# Patient Record
Sex: FEMALE | Race: ASIAN | NOT HISPANIC OR LATINO
[De-identification: names, ages, dates, MRNs, and addresses within clinical notes are randomized per-mention and may not be internally consistent; named-entity substitution may affect disease eponyms.]

---

## 2022-12-14 ENCOUNTER — APPOINTMENT (OUTPATIENT)
Dept: ULTRASOUND IMAGING | Facility: IMAGING CENTER | Age: 57
End: 2022-12-14

## 2022-12-14 ENCOUNTER — OUTPATIENT (OUTPATIENT)
Dept: OUTPATIENT SERVICES | Facility: HOSPITAL | Age: 57
LOS: 1 days | End: 2022-12-14
Payer: MEDICAID

## 2022-12-14 DIAGNOSIS — R10.9 UNSPECIFIED ABDOMINAL PAIN: ICD-10-CM

## 2022-12-14 PROCEDURE — 76856 US EXAM PELVIC COMPLETE: CPT

## 2022-12-14 PROCEDURE — 76856 US EXAM PELVIC COMPLETE: CPT | Mod: 26

## 2022-12-14 PROCEDURE — 76700 US EXAM ABDOM COMPLETE: CPT

## 2022-12-14 PROCEDURE — 76700 US EXAM ABDOM COMPLETE: CPT | Mod: 26

## 2023-01-25 ENCOUNTER — OUTPATIENT (OUTPATIENT)
Dept: OUTPATIENT SERVICES | Facility: HOSPITAL | Age: 58
LOS: 1 days | End: 2023-01-25
Payer: MEDICAID

## 2023-01-25 ENCOUNTER — APPOINTMENT (OUTPATIENT)
Dept: MAMMOGRAPHY | Facility: IMAGING CENTER | Age: 58
End: 2023-01-25
Payer: MEDICAID

## 2023-01-25 DIAGNOSIS — Z12.39 ENCOUNTER FOR OTHER SCREENING FOR MALIGNANT NEOPLASM OF BREAST: ICD-10-CM

## 2023-01-25 DIAGNOSIS — Z00.8 ENCOUNTER FOR OTHER GENERAL EXAMINATION: ICD-10-CM

## 2023-01-25 PROCEDURE — 77063 BREAST TOMOSYNTHESIS BI: CPT | Mod: 26

## 2023-01-25 PROCEDURE — 77063 BREAST TOMOSYNTHESIS BI: CPT

## 2023-01-25 PROCEDURE — 77067 SCR MAMMO BI INCL CAD: CPT | Mod: 26

## 2023-01-25 PROCEDURE — 77067 SCR MAMMO BI INCL CAD: CPT

## 2023-03-15 ENCOUNTER — OUTPATIENT (OUTPATIENT)
Dept: OUTPATIENT SERVICES | Facility: HOSPITAL | Age: 58
LOS: 1 days | Discharge: HOME | End: 2023-03-15
Payer: MEDICAID

## 2023-03-15 VITALS — TEMPERATURE: 97 F | WEIGHT: 149.91 LBS | HEIGHT: 55 IN

## 2023-03-15 VITALS
RESPIRATION RATE: 20 BRPM | SYSTOLIC BLOOD PRESSURE: 133 MMHG | DIASTOLIC BLOOD PRESSURE: 75 MMHG | HEART RATE: 81 BPM | OXYGEN SATURATION: 100 %

## 2023-03-15 PROCEDURE — 88305 TISSUE EXAM BY PATHOLOGIST: CPT | Mod: 26

## 2023-03-15 RX ORDER — SODIUM CHLORIDE 9 MG/ML
500 INJECTION, SOLUTION INTRAVENOUS
Refills: 0 | Status: COMPLETED | OUTPATIENT
Start: 2023-03-15 | End: 2023-03-15

## 2023-03-15 RX ADMIN — SODIUM CHLORIDE 30 MILLILITER(S): 9 INJECTION, SOLUTION INTRAVENOUS at 09:30

## 2023-03-20 LAB — SURGICAL PATHOLOGY STUDY: SIGNIFICANT CHANGE UP

## 2023-05-25 PROBLEM — E78.5 HYPERLIPIDEMIA, UNSPECIFIED: Chronic | Status: ACTIVE | Noted: 2023-03-15

## 2023-05-25 PROBLEM — E03.9 HYPOTHYROIDISM, UNSPECIFIED: Chronic | Status: ACTIVE | Noted: 2023-03-15

## 2023-06-02 ENCOUNTER — OUTPATIENT (OUTPATIENT)
Dept: OUTPATIENT SERVICES | Facility: HOSPITAL | Age: 58
LOS: 1 days | End: 2023-06-02
Payer: MEDICAID

## 2023-06-02 ENCOUNTER — APPOINTMENT (OUTPATIENT)
Dept: RADIOLOGY | Facility: IMAGING CENTER | Age: 58
End: 2023-06-02
Payer: MEDICAID

## 2023-06-02 DIAGNOSIS — M25.561 PAIN IN RIGHT KNEE: ICD-10-CM

## 2023-06-02 DIAGNOSIS — M25.50 PAIN IN UNSPECIFIED JOINT: ICD-10-CM

## 2023-06-02 DIAGNOSIS — M25.512 PAIN IN LEFT SHOULDER: ICD-10-CM

## 2023-06-02 DIAGNOSIS — I10 ESSENTIAL (PRIMARY) HYPERTENSION: ICD-10-CM

## 2023-06-02 PROCEDURE — 72040 X-RAY EXAM NECK SPINE 2-3 VW: CPT | Mod: 26

## 2023-06-02 PROCEDURE — 73030 X-RAY EXAM OF SHOULDER: CPT | Mod: 26,LT

## 2023-06-02 PROCEDURE — 73564 X-RAY EXAM KNEE 4 OR MORE: CPT | Mod: 26,50

## 2023-06-02 PROCEDURE — 73564 X-RAY EXAM KNEE 4 OR MORE: CPT

## 2023-06-02 PROCEDURE — 71046 X-RAY EXAM CHEST 2 VIEWS: CPT | Mod: 26

## 2023-06-02 PROCEDURE — 73030 X-RAY EXAM OF SHOULDER: CPT

## 2023-06-02 PROCEDURE — 71046 X-RAY EXAM CHEST 2 VIEWS: CPT

## 2023-06-02 PROCEDURE — 72040 X-RAY EXAM NECK SPINE 2-3 VW: CPT

## 2023-08-18 ENCOUNTER — EMERGENCY (EMERGENCY)
Facility: HOSPITAL | Age: 58
LOS: 0 days | Discharge: HOME | End: 2023-08-18
Attending: STUDENT IN AN ORGANIZED HEALTH CARE EDUCATION/TRAINING PROGRAM
Payer: MEDICAID

## 2023-08-18 VITALS
WEIGHT: 145.06 LBS | TEMPERATURE: 98 F | RESPIRATION RATE: 17 BRPM | SYSTOLIC BLOOD PRESSURE: 145 MMHG | DIASTOLIC BLOOD PRESSURE: 88 MMHG | OXYGEN SATURATION: 100 % | HEART RATE: 97 BPM | HEIGHT: 62 IN

## 2023-08-18 DIAGNOSIS — R10.13 EPIGASTRIC PAIN: ICD-10-CM

## 2023-08-18 DIAGNOSIS — R30.0 DYSURIA: ICD-10-CM

## 2023-08-18 DIAGNOSIS — E78.5 HYPERLIPIDEMIA, UNSPECIFIED: ICD-10-CM

## 2023-08-18 DIAGNOSIS — E03.9 HYPOTHYROIDISM, UNSPECIFIED: ICD-10-CM

## 2023-08-18 DIAGNOSIS — N83.202 UNSPECIFIED OVARIAN CYST, LEFT SIDE: ICD-10-CM

## 2023-08-18 DIAGNOSIS — R10.32 LEFT LOWER QUADRANT PAIN: ICD-10-CM

## 2023-08-18 LAB
ALBUMIN SERPL ELPH-MCNC: 3.4 G/DL — SIGNIFICANT CHANGE UP (ref 3.3–5)
ALP SERPL-CCNC: 125 U/L — HIGH (ref 40–120)
ALT FLD-CCNC: 22 U/L — SIGNIFICANT CHANGE UP (ref 12–78)
ANION GAP SERPL CALC-SCNC: 4 MMOL/L — LOW (ref 5–17)
APPEARANCE UR: CLEAR — SIGNIFICANT CHANGE UP
APTT BLD: 33.8 SEC — SIGNIFICANT CHANGE UP (ref 24.5–35.6)
AST SERPL-CCNC: 11 U/L — LOW (ref 15–37)
BACTERIA # UR AUTO: ABNORMAL
BASOPHILS # BLD AUTO: 0.07 K/UL — SIGNIFICANT CHANGE UP (ref 0–0.2)
BASOPHILS NFR BLD AUTO: 0.9 % — SIGNIFICANT CHANGE UP (ref 0–2)
BILIRUB SERPL-MCNC: 0.6 MG/DL — SIGNIFICANT CHANGE UP (ref 0.2–1.2)
BILIRUB UR-MCNC: NEGATIVE — SIGNIFICANT CHANGE UP
BUN SERPL-MCNC: 10 MG/DL — SIGNIFICANT CHANGE UP (ref 7–23)
CALCIUM SERPL-MCNC: 8.8 MG/DL — SIGNIFICANT CHANGE UP (ref 8.5–10.1)
CHLORIDE SERPL-SCNC: 109 MMOL/L — HIGH (ref 96–108)
CO2 SERPL-SCNC: 27 MMOL/L — SIGNIFICANT CHANGE UP (ref 22–31)
COLOR SPEC: YELLOW — SIGNIFICANT CHANGE UP
CREAT SERPL-MCNC: 0.66 MG/DL — SIGNIFICANT CHANGE UP (ref 0.5–1.3)
DIFF PNL FLD: NEGATIVE — SIGNIFICANT CHANGE UP
EGFR: 102 ML/MIN/1.73M2 — SIGNIFICANT CHANGE UP
EOSINOPHIL # BLD AUTO: 0.28 K/UL — SIGNIFICANT CHANGE UP (ref 0–0.5)
EOSINOPHIL NFR BLD AUTO: 3.5 % — SIGNIFICANT CHANGE UP (ref 0–6)
EPI CELLS # UR: SIGNIFICANT CHANGE UP
GLUCOSE SERPL-MCNC: 132 MG/DL — HIGH (ref 70–99)
GLUCOSE UR QL: NEGATIVE MG/DL — SIGNIFICANT CHANGE UP
HCT VFR BLD CALC: 36.6 % — SIGNIFICANT CHANGE UP (ref 34.5–45)
HGB BLD-MCNC: 11.8 G/DL — SIGNIFICANT CHANGE UP (ref 11.5–15.5)
IMM GRANULOCYTES NFR BLD AUTO: 0.1 % — SIGNIFICANT CHANGE UP (ref 0–0.9)
INR BLD: 0.85 RATIO — SIGNIFICANT CHANGE UP (ref 0.85–1.18)
KETONES UR-MCNC: NEGATIVE — SIGNIFICANT CHANGE UP
LEUKOCYTE ESTERASE UR-ACNC: NEGATIVE — SIGNIFICANT CHANGE UP
LIDOCAIN IGE QN: 142 U/L — SIGNIFICANT CHANGE UP (ref 73–393)
LYMPHOCYTES # BLD AUTO: 2.63 K/UL — SIGNIFICANT CHANGE UP (ref 1–3.3)
LYMPHOCYTES # BLD AUTO: 32.6 % — SIGNIFICANT CHANGE UP (ref 13–44)
MCHC RBC-ENTMCNC: 26.4 PG — LOW (ref 27–34)
MCHC RBC-ENTMCNC: 32.2 G/DL — SIGNIFICANT CHANGE UP (ref 32–36)
MCV RBC AUTO: 81.9 FL — SIGNIFICANT CHANGE UP (ref 80–100)
MONOCYTES # BLD AUTO: 0.53 K/UL — SIGNIFICANT CHANGE UP (ref 0–0.9)
MONOCYTES NFR BLD AUTO: 6.6 % — SIGNIFICANT CHANGE UP (ref 2–14)
NEUTROPHILS # BLD AUTO: 4.54 K/UL — SIGNIFICANT CHANGE UP (ref 1.8–7.4)
NEUTROPHILS NFR BLD AUTO: 56.3 % — SIGNIFICANT CHANGE UP (ref 43–77)
NITRITE UR-MCNC: NEGATIVE — SIGNIFICANT CHANGE UP
NRBC # BLD: 0 /100 WBCS — SIGNIFICANT CHANGE UP (ref 0–0)
PH UR: 7 — SIGNIFICANT CHANGE UP (ref 5–8)
PLATELET # BLD AUTO: 372 K/UL — SIGNIFICANT CHANGE UP (ref 150–400)
POTASSIUM SERPL-MCNC: 4.4 MMOL/L — SIGNIFICANT CHANGE UP (ref 3.5–5.3)
POTASSIUM SERPL-SCNC: 4.4 MMOL/L — SIGNIFICANT CHANGE UP (ref 3.5–5.3)
PROT SERPL-MCNC: 7.7 GM/DL — SIGNIFICANT CHANGE UP (ref 6–8.3)
PROT UR-MCNC: NEGATIVE MG/DL — SIGNIFICANT CHANGE UP
PROTHROM AB SERPL-ACNC: 10.2 SEC — SIGNIFICANT CHANGE UP (ref 9.5–13)
RBC # BLD: 4.47 M/UL — SIGNIFICANT CHANGE UP (ref 3.8–5.2)
RBC # FLD: 13.1 % — SIGNIFICANT CHANGE UP (ref 10.3–14.5)
RBC CASTS # UR COMP ASSIST: NEGATIVE /HPF — SIGNIFICANT CHANGE UP (ref 0–4)
SODIUM SERPL-SCNC: 140 MMOL/L — SIGNIFICANT CHANGE UP (ref 135–145)
SP GR SPEC: 1 — LOW (ref 1.01–1.02)
TROPONIN I, HIGH SENSITIVITY RESULT: 5.5 NG/L — SIGNIFICANT CHANGE UP
UROBILINOGEN FLD QL: NEGATIVE MG/DL — SIGNIFICANT CHANGE UP
WBC # BLD: 8.06 K/UL — SIGNIFICANT CHANGE UP (ref 3.8–10.5)
WBC # FLD AUTO: 8.06 K/UL — SIGNIFICANT CHANGE UP (ref 3.8–10.5)
WBC UR QL: NEGATIVE — SIGNIFICANT CHANGE UP

## 2023-08-18 PROCEDURE — 76705 ECHO EXAM OF ABDOMEN: CPT | Mod: 26

## 2023-08-18 PROCEDURE — 93010 ELECTROCARDIOGRAM REPORT: CPT

## 2023-08-18 PROCEDURE — 74177 CT ABD & PELVIS W/CONTRAST: CPT | Mod: 26,MA

## 2023-08-18 PROCEDURE — 99285 EMERGENCY DEPT VISIT HI MDM: CPT

## 2023-08-18 PROCEDURE — 76830 TRANSVAGINAL US NON-OB: CPT | Mod: 26

## 2023-08-18 PROCEDURE — 76856 US EXAM PELVIC COMPLETE: CPT | Mod: 26

## 2023-08-18 RX ORDER — KETOROLAC TROMETHAMINE 30 MG/ML
15 SYRINGE (ML) INJECTION ONCE
Refills: 0 | Status: DISCONTINUED | OUTPATIENT
Start: 2023-08-18 | End: 2023-08-18

## 2023-08-18 RX ORDER — SODIUM CHLORIDE 9 MG/ML
1000 INJECTION INTRAMUSCULAR; INTRAVENOUS; SUBCUTANEOUS ONCE
Refills: 0 | Status: COMPLETED | OUTPATIENT
Start: 2023-08-18 | End: 2023-08-18

## 2023-08-18 RX ADMIN — Medication 15 MILLIGRAM(S): at 19:40

## 2023-08-18 RX ADMIN — SODIUM CHLORIDE 1000 MILLILITER(S): 9 INJECTION INTRAMUSCULAR; INTRAVENOUS; SUBCUTANEOUS at 18:14

## 2023-08-18 RX ADMIN — Medication 15 MILLIGRAM(S): at 18:12

## 2023-08-18 NOTE — ED PROVIDER NOTE - CLINICAL SUMMARY MEDICAL DECISION MAKING FREE TEXT BOX
58-year-old female history of hypothyroidism presents for epigastric and left lower quadrant abdominal pain.  Labs, EKG, CTAP, provide IVF, meds, reassess.

## 2023-08-18 NOTE — ED PROVIDER NOTE - ATTENDING APP SHARED VISIT CONTRIBUTION OF CARE
Patient with past medical history and HPI as above, presenting with abdominal pain.    Lab work shows no leukocytosis, no hyperbilirubinemia, no sign of urine infection.  CT shows some gallbladder wall thickening, patient was sent for ultrasound of the gallbladder.  CT also shows enlargement of the left adnexa.    US of the gallbladder negative for signs of cholecytitis  Pelvic US shows left ovarian cyst with good flow    Had a discussion with the patient regarding importance of follow-up on her findings    EKG  NSR manuel 81  No St elevations or depressions  Normal intervals

## 2023-08-18 NOTE — ED ADULT TRIAGE NOTE - CHIEF COMPLAINT QUOTE
pt biba from ambulatory with walker. pt c.o of epigastric pain and constipation x 3 days, no n/v/d. hx polio, hld, hypothyroid.

## 2023-08-18 NOTE — ED ADULT NURSE NOTE - OBJECTIVE STATEMENT
patient is A&Ox4. Breathing unlabored on RA. PMH hypothyroidism, HLD. patient complaining of epigastric and LLQ abdominal pain since last night. states the pain started around 12 am. Patient denies any radiation of pain. complaining of dysuria since this morning. patient denies any n/v/d, fever, chills, cp, sob, difficulty breathing. reports taking Aleve around 7 am this morning w/o any relief. LBM 7 pm last night. patient ambulates with walker at baseline.

## 2023-08-18 NOTE — ED PROVIDER NOTE - OBJECTIVE STATEMENT
58-year-old female history of hypothyroidism presents for epigastric and left lower quadrant abdominal pain.  Patient states started about midnight last night.  Patient states she took Aleve at 7 AM without relief.  Patient denies any fevers, chills, nausea, vomiting, diarrhea, constipation and states last bowel movement was 7 PM yesterday (in contrast to triage note).  Patient also endorses some dysuria starting this morning.

## 2023-08-18 NOTE — ED PROVIDER NOTE - NSFOLLOWUPINSTRUCTIONS_ED_ALL_ED_FT
Please follow up with your primary care doctor. Specifically please follow up with your primary care doctor regarding the ovarian cysts that were seen on your CT scan. You may need follow up care for those findings or more imaging as we discussed in the ED    Return in the ED for fever, chills, abdominal pain, shortness of breath or other concerning symptoms

## 2023-08-18 NOTE — ED PROVIDER NOTE - NS ED ATTENDING STATEMENT MOD
This was a shared visit with the NEGIN. I reviewed and verified the documentation and independently performed the documented:

## 2023-08-18 NOTE — ED ADULT NURSE NOTE - NSFALLRISKINTERV_ED_ALL_ED

## 2023-08-18 NOTE — ED PROVIDER NOTE - PATIENT PORTAL LINK FT
You can access the FollowMyHealth Patient Portal offered by [facility] by registering at the following website: [email or web address]. By joining [employer]’s FollowMyHealth portal, you will also be able to view your health information using other applications (apps) compatible with our system.

## 2023-08-18 NOTE — ED PROVIDER NOTE - PHYSICAL EXAMINATION
GEN:   comfortable, in no apparent distress, AOx3  EYES:   PERRL, extra-occular movements intact  HEENT:   airway patent, moist mucosal membranes, uvula midline  CV:  RRR, Pulses- Radial: 2+ bilateral and equal  RESP:   clear to auscultation bilaterally, non-labored, speaking in full sentences  ABD:   soft, LLQ and epigastric ttp, no guarding, neg davis's sign.  MSK:   no musculoskeletal tenderness, 5/5 strength, moving all extremities  SKIN:   dry, intact, no rash  NEURO:   AOx3, no focal weakness or loss of sensation, GCS 15  PSYCH: calm, cooperative, no apparent risk to self and others

## 2023-08-18 NOTE — ED PROVIDER NOTE - NSICDXPASTMEDICALHX_GEN_ALL_CORE_FT
PAST MEDICAL HISTORY:  Dyslipidemia with elevated low density lipoprotein (LDL) cholesterol and abnormally low high density lipoprotein (HDL) cholesterol     Hypothyroid

## 2023-08-19 VITALS
OXYGEN SATURATION: 98 % | SYSTOLIC BLOOD PRESSURE: 155 MMHG | RESPIRATION RATE: 18 BRPM | HEART RATE: 96 BPM | DIASTOLIC BLOOD PRESSURE: 87 MMHG

## 2023-08-19 NOTE — ED ADULT NURSE REASSESSMENT NOTE - NS ED NURSE REASSESS COMMENT FT1
Pt AOx4 and accepts the d/c from the MD, IV hep lock removed. Pt states she doesn't have a way to get home and that she traveled to the ED by ambulance. Pt's sister Jerry Collazo phone number [phone] called and will be picking up the patient.

## 2023-08-22 LAB
-  AMPICILLIN/SULBACTAM: SIGNIFICANT CHANGE UP
-  CEFAZOLIN: SIGNIFICANT CHANGE UP
-  GENTAMICIN: SIGNIFICANT CHANGE UP
-  OXACILLIN: SIGNIFICANT CHANGE UP
-  PENICILLIN: SIGNIFICANT CHANGE UP
-  RIFAMPIN: SIGNIFICANT CHANGE UP
-  TETRACYCLINE: SIGNIFICANT CHANGE UP
-  TRIMETHOPRIM/SULFAMETHOXAZOLE: SIGNIFICANT CHANGE UP
-  VANCOMYCIN: SIGNIFICANT CHANGE UP
CULTURE RESULTS: SIGNIFICANT CHANGE UP
METHOD TYPE: SIGNIFICANT CHANGE UP
ORGANISM # SPEC MICROSCOPIC CNT: SIGNIFICANT CHANGE UP
ORGANISM # SPEC MICROSCOPIC CNT: SIGNIFICANT CHANGE UP
SPECIMEN SOURCE: SIGNIFICANT CHANGE UP

## 2023-08-23 RX ORDER — CEPHALEXIN 500 MG
1 CAPSULE ORAL
Qty: 28 | Refills: 0
Start: 2023-08-23 | End: 2023-08-29

## 2023-09-02 ENCOUNTER — OUTPATIENT (OUTPATIENT)
Dept: OUTPATIENT SERVICES | Facility: HOSPITAL | Age: 58
LOS: 1 days | End: 2023-09-02

## 2023-09-02 DIAGNOSIS — Z00.8 ENCOUNTER FOR OTHER GENERAL EXAMINATION: ICD-10-CM

## 2023-09-03 ENCOUNTER — APPOINTMENT (OUTPATIENT)
Dept: MRI IMAGING | Facility: IMAGING CENTER | Age: 58
End: 2023-09-03
Payer: MEDICAID

## 2023-09-03 ENCOUNTER — OUTPATIENT (OUTPATIENT)
Dept: OUTPATIENT SERVICES | Facility: HOSPITAL | Age: 58
LOS: 1 days | End: 2023-09-03
Payer: MEDICAID

## 2023-09-03 DIAGNOSIS — N83.202 UNSPECIFIED OVARIAN CYST, LEFT SIDE: ICD-10-CM

## 2023-09-03 DIAGNOSIS — Z00.8 ENCOUNTER FOR OTHER GENERAL EXAMINATION: ICD-10-CM

## 2023-09-03 PROCEDURE — 72195 MRI PELVIS W/O DYE: CPT | Mod: 26

## 2023-09-03 PROCEDURE — 72195 MRI PELVIS W/O DYE: CPT

## 2023-10-09 ENCOUNTER — OUTPATIENT (OUTPATIENT)
Dept: OUTPATIENT SERVICES | Facility: HOSPITAL | Age: 58
LOS: 1 days | Discharge: STILL ADMITTED OR ADMINISTRATIVE | End: 2023-10-09
Payer: MEDICAID

## 2023-10-09 PROCEDURE — 99214 OFFICE O/P EST MOD 30 MIN: CPT

## 2023-10-18 ENCOUNTER — APPOINTMENT (OUTPATIENT)
Dept: OBGYN | Facility: CLINIC | Age: 58
End: 2023-10-18
Payer: MEDICAID

## 2023-10-18 VITALS
BODY MASS INDEX: 28.02 KG/M2 | HEIGHT: 59 IN | WEIGHT: 139 LBS | DIASTOLIC BLOOD PRESSURE: 82 MMHG | SYSTOLIC BLOOD PRESSURE: 127 MMHG | HEART RATE: 96 BPM

## 2023-10-18 DIAGNOSIS — Z01.419 ENCOUNTER FOR GYNECOLOGICAL EXAMINATION (GENERAL) (ROUTINE) W/OUT ABNORMAL FINDINGS: ICD-10-CM

## 2023-10-18 DIAGNOSIS — Z87.19 PERSONAL HISTORY OF OTHER DISEASES OF THE DIGESTIVE SYSTEM: ICD-10-CM

## 2023-10-18 DIAGNOSIS — E78.5 HYPERLIPIDEMIA, UNSPECIFIED: ICD-10-CM

## 2023-10-18 DIAGNOSIS — E07.9 DISORDER OF THYROID, UNSPECIFIED: ICD-10-CM

## 2023-10-18 DIAGNOSIS — Z86.59 PERSONAL HISTORY OF OTHER MENTAL AND BEHAVIORAL DISORDERS: ICD-10-CM

## 2023-10-18 DIAGNOSIS — Z82.3 FAMILY HISTORY OF STROKE: ICD-10-CM

## 2023-10-18 DIAGNOSIS — A80.9 ACUTE POLIOMYELITIS, UNSPECIFIED: ICD-10-CM

## 2023-10-18 DIAGNOSIS — Z82.5 FAMILY HISTORY OF ASTHMA AND OTHER CHRONIC LOWER RESPIRATORY DISEASES: ICD-10-CM

## 2023-10-18 DIAGNOSIS — Z78.9 OTHER SPECIFIED HEALTH STATUS: ICD-10-CM

## 2023-10-18 DIAGNOSIS — Z12.39 ENCOUNTER FOR OTHER SCREENING FOR MALIGNANT NEOPLASM OF BREAST: ICD-10-CM

## 2023-10-18 PROCEDURE — 99386 PREV VISIT NEW AGE 40-64: CPT

## 2023-10-18 PROCEDURE — 99213 OFFICE O/P EST LOW 20 MIN: CPT | Mod: 25

## 2023-10-18 RX ORDER — LEVOTHYROXINE SODIUM 0.1 MG/1
100 TABLET ORAL
Refills: 0 | Status: ACTIVE | COMMUNITY

## 2023-10-18 RX ORDER — SIMVASTATIN 20 MG/1
20 TABLET, FILM COATED ORAL
Refills: 0 | Status: ACTIVE | COMMUNITY

## 2023-10-19 PROBLEM — E78.5 HYPERLIPIDEMIA: Status: ACTIVE | Noted: 2023-10-19

## 2023-10-19 PROBLEM — A80.9 POLIO: Status: RESOLVED | Noted: 2023-10-19 | Resolved: 2023-10-19

## 2023-10-19 PROBLEM — Z87.19 HISTORY OF GASTROESOPHAGEAL REFLUX (GERD): Status: RESOLVED | Noted: 2023-10-19 | Resolved: 2023-10-19

## 2023-10-19 PROBLEM — Z86.59 HISTORY OF INTELLECTUAL DISABILITY: Status: RESOLVED | Noted: 2023-10-19 | Resolved: 2023-10-19

## 2023-10-19 PROBLEM — Z82.5 FAMILY HISTORY OF CHRONIC OBSTRUCTIVE PULMONARY DISEASE: Status: ACTIVE | Noted: 2023-10-19

## 2023-10-19 PROBLEM — E07.9 THYROID DISORDER: Status: ACTIVE | Noted: 2023-10-19

## 2023-10-19 PROBLEM — Z78.9 NON-SMOKER: Status: ACTIVE | Noted: 2023-10-19

## 2023-10-19 PROBLEM — Z82.3 FAMILY HISTORY OF STROKE: Status: ACTIVE | Noted: 2023-10-19

## 2023-10-19 LAB
CANCER AG125 SERPL-ACNC: 4 U/ML
CANCER AG19-9 SERPL-ACNC: <2 U/ML
HPV HIGH+LOW RISK DNA PNL CVX: NOT DETECTED

## 2023-10-23 LAB — CYTOLOGY CVX/VAG DOC THIN PREP: NORMAL

## 2023-10-31 DIAGNOSIS — F32.A DEPRESSION, UNSPECIFIED: ICD-10-CM

## 2023-12-27 ENCOUNTER — OUTPATIENT (OUTPATIENT)
Dept: OUTPATIENT SERVICES | Facility: HOSPITAL | Age: 58
LOS: 1 days | End: 2023-12-27
Payer: MEDICAID

## 2023-12-27 ENCOUNTER — APPOINTMENT (OUTPATIENT)
Dept: MRI IMAGING | Facility: IMAGING CENTER | Age: 58
End: 2023-12-27
Payer: MEDICAID

## 2023-12-27 ENCOUNTER — RESULT REVIEW (OUTPATIENT)
Age: 58
End: 2023-12-27

## 2023-12-27 DIAGNOSIS — N94.89 OTHER SPECIFIED CONDITIONS ASSOCIATED WITH FEMALE GENITAL ORGANS AND MENSTRUAL CYCLE: ICD-10-CM

## 2023-12-27 DIAGNOSIS — Z00.8 ENCOUNTER FOR OTHER GENERAL EXAMINATION: ICD-10-CM

## 2023-12-27 PROCEDURE — 72197 MRI PELVIS W/O & W/DYE: CPT | Mod: 26

## 2023-12-27 PROCEDURE — 72197 MRI PELVIS W/O & W/DYE: CPT

## 2024-01-10 ENCOUNTER — APPOINTMENT (OUTPATIENT)
Dept: OBGYN | Facility: CLINIC | Age: 59
End: 2024-01-10
Payer: MEDICAID

## 2024-01-10 DIAGNOSIS — N94.89 OTHER SPECIFIED CONDITIONS ASSOCIATED WITH FEMALE GENITAL ORGANS AND MENSTRUAL CYCLE: ICD-10-CM

## 2024-01-10 PROCEDURE — 99213 OFFICE O/P EST LOW 20 MIN: CPT | Mod: 95

## 2024-01-10 NOTE — HISTORY OF PRESENT ILLNESS
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

## 2024-01-10 NOTE — PLAN
[FreeTextEntry1] : rec MRI pelvs in 6 mos--to call the month prior for auth Extensive review of above mentioned concerns. All questions and concerns addressed, patient expressed understanding. Encouraged to contact the office with any questions or concerns. Benign features on MRI imaging

## 2024-01-10 NOTE — REASON FOR VISIT
[Home] : at home, [unfilled] , at the time of the visit. [Medical Office: ([facility])___] : at the medical office located in  [Family Member] : family member [Patient] : the patient [Follow-Up] : a follow-up evaluation of

## 2024-08-20 ENCOUNTER — INPATIENT (INPATIENT)
Facility: HOSPITAL | Age: 59
LOS: 9 days | Discharge: HOME | End: 2024-08-30
Attending: PSYCHIATRY & NEUROLOGY | Admitting: PSYCHIATRY & NEUROLOGY
Payer: MEDICAID

## 2024-08-20 VITALS
OXYGEN SATURATION: 98 % | HEIGHT: 60 IN | SYSTOLIC BLOOD PRESSURE: 126 MMHG | TEMPERATURE: 98 F | WEIGHT: 143.3 LBS | DIASTOLIC BLOOD PRESSURE: 95 MMHG | RESPIRATION RATE: 18 BRPM | HEART RATE: 88 BPM

## 2024-08-20 LAB
ADD ON TEST-SPECIMEN IN LAB: SIGNIFICANT CHANGE UP
ALBUMIN SERPL ELPH-MCNC: 4.2 G/DL — SIGNIFICANT CHANGE UP (ref 3.3–5)
ALP SERPL-CCNC: 120 U/L — SIGNIFICANT CHANGE UP (ref 40–120)
ALT FLD-CCNC: 17 U/L — SIGNIFICANT CHANGE UP (ref 4–33)
ANION GAP SERPL CALC-SCNC: 13 MMOL/L — SIGNIFICANT CHANGE UP (ref 7–14)
APAP SERPL-MCNC: <10 UG/ML — LOW (ref 15–25)
APPEARANCE UR: CLEAR — SIGNIFICANT CHANGE UP
AST SERPL-CCNC: 15 U/L — SIGNIFICANT CHANGE UP (ref 4–32)
BACTERIA # UR AUTO: NEGATIVE /HPF — SIGNIFICANT CHANGE UP
BASOPHILS # BLD AUTO: 0.05 K/UL — SIGNIFICANT CHANGE UP (ref 0–0.2)
BASOPHILS NFR BLD AUTO: 0.6 % — SIGNIFICANT CHANGE UP (ref 0–2)
BILIRUB SERPL-MCNC: 0.5 MG/DL — SIGNIFICANT CHANGE UP (ref 0.2–1.2)
BILIRUB UR-MCNC: NEGATIVE — SIGNIFICANT CHANGE UP
BUN SERPL-MCNC: 8 MG/DL — SIGNIFICANT CHANGE UP (ref 7–23)
CALCIUM SERPL-MCNC: 9.1 MG/DL — SIGNIFICANT CHANGE UP (ref 8.4–10.5)
CAST: 0 /LPF — SIGNIFICANT CHANGE UP (ref 0–4)
CHLORIDE SERPL-SCNC: 102 MMOL/L — SIGNIFICANT CHANGE UP (ref 98–107)
CO2 SERPL-SCNC: 23 MMOL/L — SIGNIFICANT CHANGE UP (ref 22–31)
COLOR SPEC: YELLOW — SIGNIFICANT CHANGE UP
CREAT SERPL-MCNC: 0.66 MG/DL — SIGNIFICANT CHANGE UP (ref 0.5–1.3)
DIFF PNL FLD: NEGATIVE — SIGNIFICANT CHANGE UP
EGFR: 101 ML/MIN/1.73M2 — SIGNIFICANT CHANGE UP
EOSINOPHIL # BLD AUTO: 0.09 K/UL — SIGNIFICANT CHANGE UP (ref 0–0.5)
EOSINOPHIL NFR BLD AUTO: 1 % — SIGNIFICANT CHANGE UP (ref 0–6)
ETHANOL SERPL-MCNC: <10 MG/DL — SIGNIFICANT CHANGE UP
GLUCOSE SERPL-MCNC: 162 MG/DL — HIGH (ref 70–99)
GLUCOSE UR QL: 100 MG/DL
HCT VFR BLD CALC: 38.2 % — SIGNIFICANT CHANGE UP (ref 34.5–45)
HGB BLD-MCNC: 12.6 G/DL — SIGNIFICANT CHANGE UP (ref 11.5–15.5)
IANC: 5.45 K/UL — SIGNIFICANT CHANGE UP (ref 1.8–7.4)
IMM GRANULOCYTES NFR BLD AUTO: 0.2 % — SIGNIFICANT CHANGE UP (ref 0–0.9)
KETONES UR-MCNC: NEGATIVE MG/DL — SIGNIFICANT CHANGE UP
LEUKOCYTE ESTERASE UR-ACNC: ABNORMAL
LYMPHOCYTES # BLD AUTO: 2.71 K/UL — SIGNIFICANT CHANGE UP (ref 1–3.3)
LYMPHOCYTES # BLD AUTO: 30.7 % — SIGNIFICANT CHANGE UP (ref 13–44)
MCHC RBC-ENTMCNC: 27.5 PG — SIGNIFICANT CHANGE UP (ref 27–34)
MCHC RBC-ENTMCNC: 33 GM/DL — SIGNIFICANT CHANGE UP (ref 32–36)
MCV RBC AUTO: 83.4 FL — SIGNIFICANT CHANGE UP (ref 80–100)
MONOCYTES # BLD AUTO: 0.51 K/UL — SIGNIFICANT CHANGE UP (ref 0–0.9)
MONOCYTES NFR BLD AUTO: 5.8 % — SIGNIFICANT CHANGE UP (ref 2–14)
NEUTROPHILS # BLD AUTO: 5.45 K/UL — SIGNIFICANT CHANGE UP (ref 1.8–7.4)
NEUTROPHILS NFR BLD AUTO: 61.7 % — SIGNIFICANT CHANGE UP (ref 43–77)
NITRITE UR-MCNC: NEGATIVE — SIGNIFICANT CHANGE UP
NRBC # BLD: 0 /100 WBCS — SIGNIFICANT CHANGE UP (ref 0–0)
NRBC # FLD: 0 K/UL — SIGNIFICANT CHANGE UP (ref 0–0)
PH UR: 6.5 — SIGNIFICANT CHANGE UP (ref 5–8)
PLATELET # BLD AUTO: 365 K/UL — SIGNIFICANT CHANGE UP (ref 150–400)
POTASSIUM SERPL-MCNC: 4 MMOL/L — SIGNIFICANT CHANGE UP (ref 3.5–5.3)
POTASSIUM SERPL-SCNC: 4 MMOL/L — SIGNIFICANT CHANGE UP (ref 3.5–5.3)
PROT SERPL-MCNC: 7.9 G/DL — SIGNIFICANT CHANGE UP (ref 6–8.3)
PROT UR-MCNC: NEGATIVE MG/DL — SIGNIFICANT CHANGE UP
RBC # BLD: 4.58 M/UL — SIGNIFICANT CHANGE UP (ref 3.8–5.2)
RBC # FLD: 12.1 % — SIGNIFICANT CHANGE UP (ref 10.3–14.5)
RBC CASTS # UR COMP ASSIST: 0 /HPF — SIGNIFICANT CHANGE UP (ref 0–4)
SALICYLATES SERPL-MCNC: 2.7 MG/DL — LOW (ref 15–30)
SARS-COV-2 RNA SPEC QL NAA+PROBE: SIGNIFICANT CHANGE UP
SODIUM SERPL-SCNC: 138 MMOL/L — SIGNIFICANT CHANGE UP (ref 135–145)
SP GR SPEC: 1 — SIGNIFICANT CHANGE UP (ref 1–1.03)
SQUAMOUS # UR AUTO: 2 /HPF — SIGNIFICANT CHANGE UP (ref 0–5)
TOXICOLOGY SCREEN, DRUGS OF ABUSE, SERUM RESULT: SIGNIFICANT CHANGE UP
TSH SERPL-MCNC: 0.27 UIU/ML — SIGNIFICANT CHANGE UP (ref 0.27–4.2)
UROBILINOGEN FLD QL: 0.2 MG/DL — SIGNIFICANT CHANGE UP (ref 0.2–1)
WBC # BLD: 8.83 K/UL — SIGNIFICANT CHANGE UP (ref 3.8–10.5)
WBC # FLD AUTO: 8.83 K/UL — SIGNIFICANT CHANGE UP (ref 3.8–10.5)
WBC UR QL: 13 /HPF — HIGH (ref 0–5)

## 2024-08-20 PROCEDURE — 99285 EMERGENCY DEPT VISIT HI MDM: CPT

## 2024-08-20 RX ORDER — LEVOTHYROXINE SODIUM 100 MCG
100 TABLET ORAL DAILY
Refills: 0 | Status: DISCONTINUED | OUTPATIENT
Start: 2024-08-21 | End: 2024-08-21

## 2024-08-20 NOTE — ED BEHAVIORAL HEALTH ASSESSMENT NOTE - SUMMARY
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     Patient presents with acute on chronic worsening psychosis of unclear etiology. He is increasingly agitated and paranoid at home, experiencing significant distress around her paranoid delusions, feels unsafe at home. Would benefit from inpatient psychiatric admission for safety and stabilization. Family is in agreement with plan. Of note, patient will require an assistive walking device on the unit, can use a cane, walker, or wheelchair. Patient would also benefit from neurology consult for post-polio syndrome.     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     Patient presents with acute on chronic worsening psychosis of unclear etiology. She is increasingly agitated and paranoid at home, experiencing significant distress around her paranoid delusions, feels unsafe at home. Would benefit from inpatient psychiatric admission for safety and stabilization. Family is in agreement with plan. Of note, patient will require an assistive walking device on the unit, can use a cane, walker, or wheelchair. Patient would also benefit from neurology consult for post-polio syndrome.     Plan:  - admit patient on 9.27  - continue home medications simvastatin 40mg, synthroid 100mcg, iron, multivitamin  - hold seroquel at this time given poor efficacy  - PRN for agitation: haldol 5mg, ativan 1mg, benadryl 50mg IM/PO  - PRN for insomnia: seroquel 50mg   - defer standing medication to primary team  - recommend neurology consult on inpatient unit  - [facility]

## 2024-08-20 NOTE — ED PROVIDER NOTE - PROGRESS NOTE DETAILS
NP Mally- pt labs unremarkable, psych recommendation inpatient tx for mental health stabilization, at this time boarding, lack psych bed. Pt walks with assistant device, cane.

## 2024-08-20 NOTE — ED PROVIDER NOTE - OBJECTIVE STATEMENT
This is a 59-year-old female past medical history hypothyroidism, polio, unsteady gait,  past psychiatric history schizophrenia with complaining of auditory visual hallucinations and paranoia. Patient arrived from home after her sister Ree [phone] activated 911 due to mental health deterioration. Patient believes a man is in her closet and wants to kill her. She is   already planning her .

## 2024-08-20 NOTE — ED PROVIDER NOTE - CLINICAL SUMMARY MEDICAL DECISION MAKING FREE TEXT BOX
This is a 59-year-old female past medical history hypothyroidism, polio, unsteady gait,  past psychiatric history schizophrenia with complaining of auditory visual hallucinations and paranoia. Patient arrived from home after her sister Ree  activated 911 due to mental health deterioration. Patient believes a man is in her closet and wants to kill her. She is   already planning her . PT currently not in treatment.   psych consults, and psych clearance labs,

## 2024-08-20 NOTE — ED BEHAVIORAL HEALTH NOTE - BEHAVIORAL HEALTH NOTE
As per provider, worker called patient’s sister Ree ([phone]) for collateral information. All information is as per sister Ree:    Patient is a 59-year-old female, domiciled with younger sister, with a dx of schizophrenia bipolar, with no psychiatric admissions, bibems for worsening paranoia. Patient never went to school and usually when she meets with the doctor says that she is ok but is not forthcoming. Patient was seen a couple months ago at the [facility] and at that time was referred out to [facility]. Sister states at that time not much was done. Patient  has been saying that there is a man in her closet and that man is beating her and they are going to stab her and beat her. She states that man is planning her . Sister states that man is a family friend from years ago and the family does not communicate with this person for years. Patient as of lately has been worsening. Patient has been saying this man has been in her closet for years. Sister states that as of lately the patient has been having frequent episodes where she screams and does not know how to calm down. Sister states that the patient use to live with their mother but she passed away two years ago. Patient is linked to Pcp- dr. garibay ([phone]). Patient has polio when she was born and this affected her limbs. Patient has cholesterol and hyperthyroidism. Patient is compliant with her medications.    Levothyroxine 100 mg once a day  simvastatin 40 mg once a day  quetiapine 50 mg once a day  iron pills     Patient has a unsteady gait and  walks with walker and cane . Pt is not on any drugs or alcohol. Patient has been eating and showering at baseline. Patient has been going to bed but a lot of the time will talk to herself in her room. No si/hi. No past Sa. Patient thinks that the man that lives in her closet is punching her in the eye and now she has headaches. Patient thinks that both her sisters is poisoning her food but has thought this for years. No access to guns, Patient does not leave the house on her own.

## 2024-08-20 NOTE — ED ADULT NURSE NOTE - CAS DISCH TRANSFER METHOD
[de-identified] : Patient would benefit at this time I advised the patient as to the his condition and the treatment options available.  I would like him to begin treatment with anti-inflammatory medication.  I discussed the risk of this medication with the patient.  I also advised to utilize some sort of bracing along with activity modification to avoid impact activity and going up and down stairs.  I will see him back in 3 weeks for reassessment.  All questions answered. Ambulance

## 2024-08-20 NOTE — ED ADULT NURSE NOTE - OBJECTIVE STATEMENT
Pt arrives by ems from home for c/o hallucinations and insomnia. Pt has a hx of polio, hypothyroid, ambulates with assistance with a walker.  Pt calm on arrival, denies si,hi,etoh, drug use. Calm on arrival. Family called on pt as she was hallucinating and saying there is a man in her bedroom closet.

## 2024-08-20 NOTE — ED ADULT NURSE NOTE - IN ACCORDANCE WITH [place] LAW, WE OFFER EVERY PATIENT A HEPATITIS C TEST. WOULD YOU LIKE TO BE TESTED TODAY?
Dermatology Rooming Note    Hailee Mayo's goals for this visit include:   Chief Complaint   Patient presents with     Derm Problem     6 month skin check       Is a scribe okay for this visit:YES,     Are records needed for this visit(If yes, obtain release of information): n/a  Vitals: There were no vitals taken for this visit.    Referring Provider:  No referring provider defined for this encounter.          
Opt out

## 2024-08-20 NOTE — ED BEHAVIORAL HEALTH ASSESSMENT NOTE - NSBHATTESTCOMMENTATTENDFT_PSY_A_CORE
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 (unable to walk until age 12), HLD, hypothyroidism, chronic pain; PPH of schizophrenia (reportedly received this diagnosis in [place] when she was young), MDD, no prior hospitalizations, [facility] for [facility] mood in 10/23, referred to [facility], not currently in psychiatric treatment, taking seroquel 50mg qhs prescribed by PCP for insomnia, prior med trial of zoloft 25mg, no known history of SA/NSSIB, no legal history, no substance use, presented to [facility] ED brought in by family for worsening paranoia, hallucinations and agitation at home.     Patient presents with acute on chronic worsening psychosis of unclear etiology. She is increasingly agitated and paranoid at home, experiencing significant distress around her paranoid delusions, feels unsafe at home. Would benefit from inpatient psychiatric admission for safety and stabilization. Family is in agreement with plan. Of note, patient will require an assistive walking device on the unit, can use a cane, walker, or wheelchair. Patient would also benefit from neurology consult for post-polio syndrome.

## 2024-08-20 NOTE — ED BEHAVIORAL HEALTH ASSESSMENT NOTE - OTHER PAST PSYCHIATRIC HISTORY (INCLUDE DETAILS REGARDING ONSET, COURSE OF ILLNESS, INPATIENT/OUTPATIENT TREATMENT)
PPH of schizophrenia (reportedly received this diagnosis in [place] when she was young), MDD, no prior hospitalizations, seen in [facility] for depressed mood in 10/23, referred to [facility], not currently in psychiatric treatment, taking seroquel 50mg qhs prescribed by PCP for insomnia, prior med trial of zoloft 25mg, no known history of SA/NSSIB, no legal history, no substance use

## 2024-08-20 NOTE — ED ADULT NURSE REASSESSMENT NOTE - NS ED NURSE REASSESS COMMENT FT1
Pt lying in stretcher, not in acute distress, denies pain and auditory hallucinations at this time. Respirations are even and unlabored. Vitals as charted. Bed in lowest position, comfort measures provided, safety maintained.

## 2024-08-20 NOTE — ED ADULT NURSE NOTE - NSFALLUNIVINTERV_ED_ALL_ED
Bed/Stretcher in lowest position, wheels locked, appropriate side rails in place/Call bell, personal items and telephone in reach/Instruct patient to call for assistance before getting out of bed/chair/stretcher/Non-slip footwear applied when patient is off stretcher/[place] to call system/Physically safe environment - no spills, clutter or unnecessary equipment/Purposeful proactive rounding/Room/bathroom lighting operational, light cord in reach

## 2024-08-20 NOTE — ED BEHAVIORAL HEALTH ASSESSMENT NOTE - RISK ASSESSMENT
Risk factors:  chronic pain, recent loss, not receiving treatment, unable to care for self 2/2 psychiatric illness    Protective factors: no current SIIP/HIIP, no h/o SA/SIB, no h/o psych admissions, no access to weapons, no active substance abuse    Overall patient is a low acute risk of suicide, but presents with worsening agitation which increases her risk of harm to self and others as well as an inability to care for herself with her untreated psychosis.

## 2024-08-20 NOTE — ED BEHAVIORAL HEALTH ASSESSMENT NOTE - NSSUICPROTFACT_PSY_ALL_CORE
Supportive social network of family or friends/Fear of death or the actual act of killing self/Cultural, spiritual and/or moral attitudes against suicide

## 2024-08-20 NOTE — ED BEHAVIORAL HEALTH ASSESSMENT NOTE - DESCRIPTION
Vital Signs Last 24 Hrs  T(C): 36.7 (20 Aug 2024 18:00), Max: 36.7 (20 Aug 2024 18:00)  T(F): 98 (20 Aug 2024 18:00), Max: 98 (20 Aug 2024 18:00)  HR: 88 (20 Aug 2024 18:00) (88 - 88)  BP: 126/95 (20 Aug 2024 18:00) (126/95 - 126/95)  BP(mean): --  RR: 18 (20 Aug 2024 18:00) (18 - 18)  SpO2: 98% (20 Aug 2024 18:00) (98% - 98%)    Parameters below as of 20 Aug 2024 18:00  Patient On (Oxygen Delivery Method): room air    No acute agitation polio in infancy (unable to walk until age 12), HLD, hypothyroidism, chronic pain no education, lives with family, on disability

## 2024-08-20 NOTE — ED BEHAVIORAL HEALTH ASSESSMENT NOTE - HPI (INCLUDE ILLNESS QUALITY, SEVERITY, DURATION, TIMING, CONTEXT, MODIFYING FACTORS, ASSOCIATED SIGNS AND SYMPTOMS)
Per Crisis Clinic Evaluation 10/9/2023:  PER CASE PRESENTATION BY STAFF: Pt is a 58-year-old female, single, non-caregiver and domiciled between her sister's homes. PPH: Pt has no formal psychiatric history; no previous suicide attempts and no NSSIB. 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 Chronic pain, hypothyroid, HLD. NKDA, Substance/ETOH use: Denies, Pt denies legal issues and denies hx of violence/aggression. Pt denies access to firearms. Family history: denies.  [facility] for evaluation at suggestion of PMD. Patient presents as a poor historian, unable to meaningfully engage in assessment or provide pertinent information about the reason for her visit today. Most of the information obtained from her sister, who accompany patient today. Patient's mother passed away in 2022 and now resided between two of her sister's home. Patient describes feeling sad and sleeping during the day. She denies difficulties falling and stay in asleep. Per collateral form her sister, patient gets into bet around 8:30/9pm, sister has concerns she toss and turn but patient denies. Wakes between 7am and noon. This behavior is chronic and is not a deviation from her baseline. She is eating okay. She chronically things think people are upset with her when they are now. Sister providers example that if she is in the kitchen cooking, patient might think the sister is taking about her. Patient denies thinking anyone is following her or out to harm her, doesn't believe she is being monitored. She denies AVH, does not observe her talking to herself. No concerns for panic, and no overt delusional content elicited. Patient sister is concerned that patient is depressed because she is not as engaged as she used to be and is often quiet.  Current medication: Levothyroxine 100 mcg OD, Simvastatin 10 mg OD, Calcium, Vitamin D and multivitamin    ON INTERVIEW WITH ME: Patient confirms above, with the following corrections/emphases: Patient and provider are located at the clinic. She was seen with her sister Ree at her request. Patient is a very poor historian, unable to provide hx of symptoms and only able to respond to very direct questions. TP is concrete.  Par collateral: Patient was born in [facility] and migrated [place] the [place] in . She contracted Polio in infancy, learned to walk when she was 13 years old, used a cane/walker since then, chronic hx of falls and last was 2 weeks ago in the house, denies injury or head strike. No formal education and never went to school. She has been cared for by her parents her whole life. Her speech was not affected. Her mother  2022 and father 5 years ago. She now lived between her 2 sisters. She is independent with ADLs, family prepares meals, but she feeds herself. She never goes out on her own due to her mobility and cognition. Her sister takes her to her appointments.  Ree noticed their mother , patient appears sad, more withdrawal and sleeping more than usual. She lays in bed more. Patient admits she is sad, denies persistent feelings of sadness, admits she wants to be in bed more. She denies crying, denies thoughts of wanting to be dead. No concerns for delusions, paranoia, no SI/HI AVH, denies anxiety and kate.  Medication trial: Sertraline 25mg Siblings have been taking care of her since their mom passed. They noticed that since she left she's been talking to herself at night.     Per Crisis Clinic Evaluation 10/9/2023:  PER CASE PRESENTATION BY STAFF: Pt is a 58-year-old female, single, non-caregiver and domiciled between her sister's homes. PPH: Pt has no formal psychiatric history; no previous suicide attempts and no NSSIB. 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 Chronic pain, hypothyroid, HLD. NKDA, Substance/ETOH use: Denies, Pt denies legal issues and denies hx of violence/aggression. Pt denies access to firearms. Family history: denies.  Pt presents at [facility] for evaluation at suggestion of PMD. Patient presents as a poor historian, unable to meaningfully engage in assessment or provide pertinent information about the reason for her visit today. Most of the information obtained from her sister, who accompany patient today. Patient's mother passed away in 2022 and now resided between two of her sister's home. Patient describes feeling sad and sleeping during the day. She denies difficulties falling and stay in asleep. Per collateral form her sister, patient gets into bet around 8:30/9pm, sister has concerns she toss and turn but patient denies. Wakes between 7am and noon. This behavior is chronic and is not a deviation from her baseline. She is eating okay. She chronically things think people are upset with her when they are now. Sister providers example that if she is in the kitchen cooking, patient might think the sister is taking about her. Patient denies thinking anyone is following her or out to harm her, doesn't believe she is being monitored. She denies AVH, does not observe her talking to herself. No concerns for panic, and no overt delusional content elicited. Patient sister is concerned that patient is depressed because she is not as engaged as she used to be and is often quiet.  Current medication: Levothyroxine 100 mcg OD, Simvastatin 10 mg OD, Calcium, Vitamin D and multivitamin    ON INTERVIEW WITH ME: Patient confirms above, with the following corrections/emphases: Patient and provider are located at the clinic. She was seen with her sister Ree at her request. Patient is a very poor historian, unable to provide hx of symptoms and only able to respond to very direct questions. TP is concrete.  Par collateral: Patient was born in [facility] and migrated to the  in . She contracted Polio in infancy, learned to walk when she was 13 years old, used a cane/walker since then, chronic hx of falls and last was 2 weeks ago in the house, denies injury or head strike. No formal education and never went to school. She has been cared for by her parents her whole life. Her speech was not affected. Her mother  2022 and father 5 years ago. She now lived between her 2 sisters. She is independent with ADLs, family prepares meals, but she feeds herself. She never goes out on her own due to her mobility and cognition. Her sister takes her to her appointments.  Ree noticed their mother , patient appears sad, more withdrawal and sleeping more than usual. She lays in bed more. Patient admits she is sad, denies persistent feelings of sadness, admits she wants to be in bed more. She denies crying, denies thoughts of wanting to be dead. No concerns for delusions, paranoia, no SI/HI AVH, denies anxiety and kate.  Medication trial: Sertraline 25mg This is a 59 year old [place] woman, single, lives with sister, on disability, no dependents; PMH of polio in infancy (unable to walk until age 12), HLD, hypothyroidism, chronic pain; PPH of schizophrenia (reportedly received this diagnosis in [place] when she was young), MDD, no prior hospitalizations, seen in [facility] for depressed mood in 10/23, referred to [facility], not currently in psychiatric treatment, taking seroquel 50mg qhs prescribed by PCP for insomnia, prior med trial of zoloft 25mg, no known history of SA/NSSIB, no legal history, no substance use, presented to [facility] ED brought in by family for worsening paranoia, hallucinations and agitation at home.     History obtained from patient and from her sister, Ree. Patient lived with her mother her entire life and was relatively stable at home without psychiatric treatment, though the sisters were told she had schizophrenia. Since her mother's death in 2022 the patient has been living with siblings and has become increasingly more withdrawn and disorganized. They note that she is up all night talking to herself and talks about people in her closet that are trying to hurt her. Also endorses paranoia about her sister poisoning her food. Lately, these symptoms have been progressively worsening and the patient has become increasingly agitated, screaming and crying at night, trying to get out of her room. At times her family is unable to redirect her. Family reports that she told them today that there would be a  for her tomorrow because the people in the closet were going to burn her and kill her. They are concerned that she does not have an appropriate assessment or treatment to manage her condition at home. They would like to be able to continue caring for her at home but cannot do so in her current state given her paranoia and agitation. They report that she is compliant with her medications Levothyroxine 100 mg once a day, simvastatin 40 mg once a day, quetiapine 50 mg once a day,  iron pills. Quetiapine was started by PCP to help with insomnia but Ree reports that the patient still sleeps very poorly.        On assessment the patient makes good EC, is oriented only to hospital and month. States that she is here because the people in her closet are trying to hurt her and she is very afraid. Asks if she can live in a nursing home because she does not feel safe in her room at home and believes her sister is also plotting against her. Of note, she is somewhat dysarthric and concrete. She wants help and feels too afraid to return home at this time.     SH: She contracted Polio in infancy, grew up in [facility]. She learned to walk when she was 13 years old, used a cane/walker since then, chronic hx of falls, needs assistance with ambulation. No formal education and never went to school. She has been cared for by her parents her whole life. She is independent with ADLs, family prepares meals, but she feeds herself. She never goes out on her own due to her mobility and cognition. Her sister takes her to her appointments.   No history of substance abuse.  Currently living with sister Inez Collazo ([phone])

## 2024-08-21 DIAGNOSIS — Z86.12 PERSONAL HISTORY OF POLIOMYELITIS: ICD-10-CM

## 2024-08-21 DIAGNOSIS — F29 UNSPECIFIED PSYCHOSIS NOT DUE TO A SUBSTANCE OR KNOWN PHYSIOLOGICAL CONDITION: ICD-10-CM

## 2024-08-21 LAB
AMPHET UR-MCNC: NEGATIVE — SIGNIFICANT CHANGE UP
BARBITURATES UR SCN-MCNC: NEGATIVE — SIGNIFICANT CHANGE UP
BENZODIAZ UR-MCNC: NEGATIVE — SIGNIFICANT CHANGE UP
COCAINE METAB.OTHER UR-MCNC: NEGATIVE — SIGNIFICANT CHANGE UP
CREATININE URINE RESULT, DAU: 75 MG/DL — SIGNIFICANT CHANGE UP
FENTANYL UR QL SCN: NEGATIVE — SIGNIFICANT CHANGE UP
METHADONE UR-MCNC: NEGATIVE — SIGNIFICANT CHANGE UP
OPIATES UR-MCNC: NEGATIVE — SIGNIFICANT CHANGE UP
OXYCODONE UR-MCNC: NEGATIVE — SIGNIFICANT CHANGE UP
PCP SPEC-MCNC: SIGNIFICANT CHANGE UP
PCP UR-MCNC: NEGATIVE — SIGNIFICANT CHANGE UP
THC UR QL: NEGATIVE — SIGNIFICANT CHANGE UP

## 2024-08-21 PROCEDURE — 99212 OFFICE O/P EST SF 10 MIN: CPT

## 2024-08-21 PROCEDURE — 99222 1ST HOSP IP/OBS MODERATE 55: CPT

## 2024-08-21 RX ORDER — OLANZAPINE 7.5 MG/1
5 TABLET ORAL AT BEDTIME
Refills: 0 | Status: DISCONTINUED | OUTPATIENT
Start: 2024-08-21 | End: 2024-08-23

## 2024-08-21 RX ORDER — SENNA 187 MG
2 TABLET ORAL AT BEDTIME
Refills: 0 | Status: DISCONTINUED | OUTPATIENT
Start: 2024-08-21 | End: 2024-08-30

## 2024-08-21 RX ORDER — LORAZEPAM 4 MG/ML
1 INJECTION INTRAMUSCULAR; INTRAVENOUS ONCE
Refills: 0 | Status: DISCONTINUED | OUTPATIENT
Start: 2024-08-21 | End: 2024-08-28

## 2024-08-21 RX ORDER — LEVOTHYROXINE SODIUM 100 MCG
100 TABLET ORAL DAILY
Refills: 0 | Status: DISCONTINUED | OUTPATIENT
Start: 2024-08-22 | End: 2024-08-23

## 2024-08-21 RX ORDER — MAGNESIUM, ALUMINUM HYDROXIDE 200-225/5
30 SUSPENSION, ORAL (FINAL DOSE FORM) ORAL EVERY 6 HOURS
Refills: 0 | Status: DISCONTINUED | OUTPATIENT
Start: 2024-08-21 | End: 2024-08-30

## 2024-08-21 RX ORDER — CALCIUM CARBONATE/VITAMIN D3 500MG-5MCG
1 TABLET ORAL DAILY
Refills: 0 | Status: DISCONTINUED | OUTPATIENT
Start: 2024-08-21 | End: 2024-08-23

## 2024-08-21 RX ORDER — FERROUS SULFATE 325(65) MG
325 TABLET ORAL DAILY
Refills: 0 | Status: DISCONTINUED | OUTPATIENT
Start: 2024-08-21 | End: 2024-08-28

## 2024-08-21 RX ORDER — HALOPERIDOL 1 MG
5 TABLET ORAL ONCE
Refills: 0 | Status: DISCONTINUED | OUTPATIENT
Start: 2024-08-21 | End: 2024-08-30

## 2024-08-21 RX ORDER — ACETAMINOPHEN 325 MG/1
650 TABLET ORAL EVERY 6 HOURS
Refills: 0 | Status: DISCONTINUED | OUTPATIENT
Start: 2024-08-21 | End: 2024-08-30

## 2024-08-21 RX ORDER — PSYLLIUM HUSK 0.4 G
1 CAPSULE ORAL DAILY
Refills: 0 | Status: DISCONTINUED | OUTPATIENT
Start: 2024-08-21 | End: 2024-08-28

## 2024-08-21 RX ORDER — LORAZEPAM 4 MG/ML
1 INJECTION INTRAMUSCULAR; INTRAVENOUS EVERY 6 HOURS
Refills: 0 | Status: DISCONTINUED | OUTPATIENT
Start: 2024-08-21 | End: 2024-08-28

## 2024-08-21 RX ORDER — PANTOPRAZOLE SODIUM 40 MG
40 TABLET, DELAYED RELEASE (ENTERIC COATED) ORAL
Refills: 0 | Status: DISCONTINUED | OUTPATIENT
Start: 2024-08-21 | End: 2024-08-30

## 2024-08-21 RX ORDER — HALOPERIDOL 1 MG
5 TABLET ORAL EVERY 6 HOURS
Refills: 0 | Status: DISCONTINUED | OUTPATIENT
Start: 2024-08-21 | End: 2024-08-30

## 2024-08-21 RX ADMIN — OLANZAPINE 5 MILLIGRAM(S): 7.5 TABLET ORAL at 20:29

## 2024-08-21 RX ADMIN — Medication 100 MICROGRAM(S): at 06:04

## 2024-08-21 RX ADMIN — Medication 2 TABLET(S): at 20:29

## 2024-08-21 RX ADMIN — ACETAMINOPHEN 650 MILLIGRAM(S): 325 TABLET ORAL at 20:30

## 2024-08-21 RX ADMIN — Medication 20 MILLIGRAM(S): at 20:29

## 2024-08-21 RX ADMIN — ACETAMINOPHEN 650 MILLIGRAM(S): 325 TABLET ORAL at 21:57

## 2024-08-21 NOTE — ED ADULT NURSE REASSESSMENT NOTE - NS ED NURSE REASSESS COMMENT FT1
break coverage RN: pt resting comfortably at this time, respirations even and unlabored, appears in NAD. No complaints of chest pain, headache, nausea, dizziness, vomiting  SOB, fever, chills verbalized. safety measures in place. pending bed assignment.

## 2024-08-21 NOTE — BH INPATIENT PSYCHIATRY ASSESSMENT NOTE - ATTENDING COMMENTS
Care was discussed and reviewed in the interdisciplinary treatment team.  I, Rosalinda Jasso MD, have reviewed and verified the documentation.  I independently performed the documented medical decision making.      Patient was seen and evaluated with NP present during the assessment. Patient verbalized experiencing AH of voices coming out of the closet telling her they are going to harm her. Patient said that this voices cause a lot of stress, but at the same time she was laughing while saying this.

## 2024-08-21 NOTE — BH INPATIENT PSYCHIATRY ASSESSMENT NOTE - NSBHMETABOLIC_PSY_ALL_CORE_FT
BMI: BMI (kg/m2): 27.7 (08-21-24 @ 13:30)  HbA1c:   Glucose: POCT Blood Glucose.: 193 mg/dL (08-20-24 @ 18:04)    BP: 105/64 (08-21-24 @ 12:33) (105/64 - 151/85)Vital Signs Last 24 Hrs  T(C): 36.4 (08-21-24 @ 13:30), Max: 36.8 (08-21-24 @ 10:24)  T(F): 97.5 (08-21-24 @ 13:30), Max: 98.2 (08-21-24 @ 10:24)  HR: 75 (08-21-24 @ 12:33) (74 - 88)  BP: 105/64 (08-21-24 @ 12:33) (105/64 - 151/85)  BP(mean): --  RR: 16 (08-21-24 @ 13:30) (16 - 18)  SpO2: 97% (08-21-24 @ 12:33) (97% - 100%)    Orthostatic VS  08-21-24 @ 13:30  Lying BP: --/-- HR: --  Sitting BP: 136/89 HR: 78  Standing BP: 139/93 HR: 97  Site: --  Mode: --    Lipid Panel:  BMI: BMI (kg/m2): 27.7 (08-21-24 @ 13:30)  HbA1c:   Glucose: POCT Blood Glucose.: 193 mg/dL (08-20-24 @ 18:04)    BP: 105/64 (08-21-24 @ 12:33) (105/64 - 151/85)Vital Signs Last 24 Hrs  T(C): 36.4 (08-22-24 @ 09:03), Max: 36.8 (08-21-24 @ 12:33)  T(F): 97.6 (08-22-24 @ 09:03), Max: 98.2 (08-21-24 @ 12:33)  HR: 75 (08-21-24 @ 12:33) (75 - 75)  BP: 105/64 (08-21-24 @ 12:33) (105/64 - 105/64)  BP(mean): --  RR: 16 (08-21-24 @ 13:30) (16 - 18)  SpO2: 97% (08-21-24 @ 12:33) (97% - 97%)    Orthostatic VS  08-22-24 @ 09:03  Lying BP: --/-- HR: --  Sitting BP: 120/69 HR: 103  Standing BP: --/-- HR: --  Site: --  Mode: --  Orthostatic VS  08-21-24 @ 13:30  Lying BP: --/-- HR: --  Sitting BP: 136/89 HR: 78  Standing BP: 139/93 HR: 97  Site: --  Mode: --    Lipid Panel:  BMI: BMI (kg/m2): 27.7 (08-21-24 @ 13:30)  HbA1c:   Glucose: POCT Blood Glucose.: 193 mg/dL (08-20-24 @ 18:04)    BP: 105/64 (08-21-24 @ 12:33) (105/64 - 151/85)Vital Signs Last 24 Hrs  T(C): 36.4 (08-22-24 @ 09:03), Max: 36.4 (08-22-24 @ 09:03)  T(F): 97.6 (08-22-24 @ 09:03), Max: 97.6 (08-22-24 @ 09:03)  HR: --  BP: --  BP(mean): --  RR: --  SpO2: --    Orthostatic VS  08-22-24 @ 09:03  Lying BP: --/-- HR: --  Sitting BP: 120/69 HR: 103  Standing BP: --/-- HR: --  Site: --  Mode: --  Orthostatic VS  08-21-24 @ 13:30  Lying BP: --/-- HR: --  Sitting BP: 136/89 HR: 78  Standing BP: 139/93 HR: 97  Site: --  Mode: --    Lipid Panel:

## 2024-08-21 NOTE — BH INPATIENT PSYCHIATRY ASSESSMENT NOTE - NSBHASSESSSUMMFT_PSY_ALL_CORE
This is a 59 year old [place] woman, single, lives with sister, on disability, no dependents; PMH of polio in infancy (unable to walk until age 12), HLD, hypothyroidism, chronic pain; PPH of schizophrenia (reportedly received this diagnosis in [place] when she was young), MDD, no prior hospitalizations, seen in [facility] CC for depressed mood in 10/23, referred to [facility], not currently in psychiatric treatment, taking seroquel 50mg qhs prescribed by PCP for insomnia, prior med trial of zoloft 25mg, no known history of SA/NSSIB, no legal history, no substance use, presented to [facility] ED brought in by family for worsening paranoia, hallucinations and agitation at home.       On assessment today, patient was pleasant and cooperative, inappropriately laughing, +AH, denied VH, SIIP.     Plan: Admit to 2West, 2PC  -Admit to 2west   -No CO needed, routine observation, q15 checks   -Psychiatry:   OLANZapine Disintegrating Tablet 5 milliGRAM(s) Oral at bedtime  haloperidol     Tablet 5 milliGRAM(s) Oral every 6 hours PRN agitation  haloperidol    Injectable 5 milliGRAM(s) IntraMuscular once PRN severe agitation  LORazepam     Tablet 1 milliGRAM(s) Oral every 6 hours PRN Agitation  LORazepam   Injectable 1 milliGRAM(s) IntraMuscular once PRN severe agitation  -Medical:  atorvastatin 20 milliGRAM(s) Oral at bedtime  calcium carbonate 1250 mG  + Vitamin D (OsCal 500 + D) 1 Tablet(s) Oral daily  ferrous    sulfate 325 milliGRAM(s) Oral daily  multivitamin/minerals 1 Tablet(s) Oral daily  pantoprazole    Tablet 40 milliGRAM(s) Oral before breakfast  senna 2 Tablet(s) Oral at bedtime  acetaminophen     Tablet .. 650 milliGRAM(s) Oral every 6 hours PRN Mild Pain (1 - 3), Moderate Pain (4 - 6), Severe Pain (7 - 10)  aluminum hydroxide/magnesium hydroxide/simethicone Suspension 30 milliLiter(s) Oral every 6 hours PRN Dyspepsia  -Group and milieu therapy  -Dispo as per SW

## 2024-08-21 NOTE — PHYSICAL THERAPY INITIAL EVALUATION ADULT - IMPAIRMENTS FOUND, PT EVAL
aerobic capacity/endurance/gait, locomotion, and balance/joint integrity and mobility/muscle strength/poor safety awareness/posture

## 2024-08-21 NOTE — BH PATIENT PROFILE - FALL HARM RISK - HARM RISK INTERVENTIONS
Assistance with ambulation/Assistance OOB with selected safe patient handling equipment/Communicate Risk of Fall with Harm to all staff/Discuss with provider need for PT consult/Monitor gait and stability/Move patient closer to nurses' station/Provide patient with walking aids - walker, cane, crutches/Reinforce activity limits and safety measures with patient and family/Reorient to person, place and time as needed/Review medications for side effects contributing to fall risk/Tailored Fall Risk Interventions/Visual Cue: Yellow wristband and red socks/Other:/Bed in lowest position, wheels locked, appropriate side rails in place/Call bell, personal items and telephone in reach/Instruct patient to call for assistance before getting out of bed or chair/Non-slip footwear when patient is out of bed/[place] to call system/Physically safe environment - no spills, clutter or unnecessary equipment/Purposeful Proactive Rounding/Room/bathroom lighting operational, light cord in reach

## 2024-08-21 NOTE — ED BEHAVIORAL HEALTH PROGRESS NOTE - NSBHMSEAFFCONG_PSY_A_CORE
Metronidazole Counseling:  I discussed with the patient the risks of metronidazole including but not limited to seizures, nausea/vomiting, a metallic taste in the mouth, nausea/vomiting and severe allergy. Congruent

## 2024-08-21 NOTE — ED BEHAVIORAL HEALTH PROGRESS NOTE - DANGER TO SELF; MENTAL ILLNESS EXPECTED TO RESPOND TO INPATIENT CARE
Price (Do Not Change): 0.00
Instructions: This plan will send the code FBSE to the PM system.  DO NOT or CHANGE the price.
Detail Level: Simple
Unable to care for self

## 2024-08-21 NOTE — ED BEHAVIORAL HEALTH PROGRESS NOTE - NSBHMSEKNOWHOW_PSY_ALL_CORE
[de-identified] : Overall, Mr. ERASMO Manzo is doing very well and is very happy with the procedure. He reports good return to activity.  The only problem he has is intermittent discomfort in the left knee.  We discussed that it may be related to the left hip osteoarthritis, but the patient remains skeptical about it.  He will monitor his knee activity better and we will revisit this question at the next visit.  X-rays of bilateral hips and knees are planned.  Patient will follow-up at 1 year nathan from surgery.  We also discussed dental prophylaxis. Educational attainment

## 2024-08-21 NOTE — ED ADULT NURSE REASSESSMENT NOTE - NS ED NURSE REASSESS COMMENT FT1
Pt lying in stretcher, not in acute distress, denies pain at this time. Respirations are even and unlabored, bed in lowest position, comfort measures provided, safety maintained.

## 2024-08-21 NOTE — BH INPATIENT PSYCHIATRY ASSESSMENT NOTE - CURRENT MEDICATION
MEDICATIONS  (STANDING):  atorvastatin 20 milliGRAM(s) Oral at bedtime  calcium carbonate 1250 mG  + Vitamin D (OsCal 500 + D) 1 Tablet(s) Oral daily  ferrous    sulfate 325 milliGRAM(s) Oral daily  multivitamin/minerals 1 Tablet(s) Oral daily  OLANZapine Disintegrating Tablet 5 milliGRAM(s) Oral at bedtime  pantoprazole    Tablet 40 milliGRAM(s) Oral before breakfast  senna 2 Tablet(s) Oral at bedtime    MEDICATIONS  (PRN):  acetaminophen     Tablet .. 650 milliGRAM(s) Oral every 6 hours PRN Mild Pain (1 - 3), Moderate Pain (4 - 6), Severe Pain (7 - 10)  aluminum hydroxide/magnesium hydroxide/simethicone Suspension 30 milliLiter(s) Oral every 6 hours PRN Dyspepsia  haloperidol     Tablet 5 milliGRAM(s) Oral every 6 hours PRN agitation  haloperidol    Injectable 5 milliGRAM(s) IntraMuscular once PRN severe agitation  LORazepam     Tablet 1 milliGRAM(s) Oral every 6 hours PRN Agitation  LORazepam   Injectable 1 milliGRAM(s) IntraMuscular once PRN severe agitation   MEDICATIONS  (STANDING):  atorvastatin 20 milliGRAM(s) Oral at bedtime  calcium carbonate 1250 mG  + Vitamin D (OsCal 500 + D) 1 Tablet(s) Oral daily  ferrous    sulfate 325 milliGRAM(s) Oral daily  levothyroxine 100 MICROGram(s) Oral daily  multivitamin/minerals 1 Tablet(s) Oral daily  OLANZapine Disintegrating Tablet 5 milliGRAM(s) Oral at bedtime  pantoprazole    Tablet 40 milliGRAM(s) Oral before breakfast  senna 2 Tablet(s) Oral at bedtime    MEDICATIONS  (PRN):  acetaminophen     Tablet .. 650 milliGRAM(s) Oral every 6 hours PRN Mild Pain (1 - 3), Moderate Pain (4 - 6), Severe Pain (7 - 10)  aluminum hydroxide/magnesium hydroxide/simethicone Suspension 30 milliLiter(s) Oral every 6 hours PRN Dyspepsia  haloperidol     Tablet 5 milliGRAM(s) Oral every 6 hours PRN agitation  haloperidol    Injectable 5 milliGRAM(s) IntraMuscular once PRN severe agitation  LORazepam     Tablet 1 milliGRAM(s) Oral every 6 hours PRN Agitation  LORazepam   Injectable 1 milliGRAM(s) IntraMuscular once PRN severe agitation   MEDICATIONS  (STANDING):  atorvastatin 20 milliGRAM(s) Oral at bedtime  calcium carbonate 1250 mG  + Vitamin D (OsCal 500 + D) 1 Tablet(s) Oral daily  ferrous    sulfate 325 milliGRAM(s) Oral daily  levothyroxine 100 MICROGram(s) Oral daily  multivitamin/minerals 1 Tablet(s) Oral daily  OLANZapine Disintegrating Tablet 5 milliGRAM(s) Oral at bedtime  pantoprazole    Tablet 40 milliGRAM(s) Oral before breakfast  senna 2 Tablet(s) Oral at bedtime    MEDICATIONS  (PRN):  acetaminophen     Tablet .. 650 milliGRAM(s) Oral every 6 hours PRN Mild Pain (1 - 3), Moderate Pain (4 - 6), Severe Pain (7 - 10)  aluminum hydroxide/magnesium hydroxide/simethicone Suspension 30 milliLiter(s) Oral every 6 hours PRN Dyspepsia  artificial  tears Solution 1 Drop(s) Both EYES two times a day PRN Dry Eyes  haloperidol     Tablet 5 milliGRAM(s) Oral every 6 hours PRN agitation  haloperidol    Injectable 5 milliGRAM(s) IntraMuscular once PRN severe agitation  ibuprofen  Tablet. 400 milliGRAM(s) Oral every 6 hours PRN Moderate Pain (4 - 6), Severe Pain (7 - 10)  LORazepam     Tablet 1 milliGRAM(s) Oral every 6 hours PRN Agitation  LORazepam   Injectable 1 milliGRAM(s) IntraMuscular once PRN severe agitation

## 2024-08-21 NOTE — PHYSICAL THERAPY INITIAL EVALUATION ADULT - PERTINENT HX OF CURRENT PROBLEM, REHAB EVAL
Patient is a 59 year old [place] woman, single, lives with sister, on disability, no dependents; PMH of polio in infancy (unable to walk until age 12), HLD, hypothyroidism, chronic pain; PPH of schizophrenia (reportedly received this diagnosis in [place] when she was young), MDD, no prior hospitalizations, seen in [facility] for depressed mood in 10/23, referred to [facility], not currently in psychiatric treatment, taking seroquel 50mg qhs prescribed by PCP for insomnia, prior med trial of zoloft 25mg, no known history of SA/NSSIB, no legal history, no substance use, presented to [facility] ED brought in by family for worsening paranoia, hallucinations and agitation at home. Patient referred to Physical therapy secondary to unsteady gait

## 2024-08-21 NOTE — ED BEHAVIORAL HEALTH PROGRESS NOTE - CASE SUMMARY/FORMULATION (CLEARLY DOCUMENT RATIONALE FOR DISPOSITION CHANGE)
This is a 59 year old [place] woman, single, lives with sister, on disability, no dependents; PMH of polio in infancy (unable to walk until age 12), HLD, hypothyroidism, chronic pain; PPH of schizophrenia (reportedly received this diagnosis in [place] when she was young), MDD, no prior hospitalizations, seen in [facility] for depressed mood in 10/23, referred to [facility], not currently in psychiatric treatment, taking seroquel 50mg qhs prescribed by PCP for insomnia, prior med trial of zoloft 25mg, no known history of SA/NSSIB, no legal history, no substance use, presented to [facility] ED brought in by family for worsening paranoia, hallucinations and agitation at home.     Patient presents with acute on chronic worsening psychosis of unclear etiology. He is increasingly agitated and paranoid at home, experiencing significant distress around her paranoid delusions, feels unsafe at home. Would benefit from inpatient psychiatric admission for safety and stabilization.

## 2024-08-21 NOTE — ED BEHAVIORAL HEALTH PROGRESS NOTE - SUMMARY
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 ED brought in by family for worsening paranoia, hallucinations and agitation at home.     Patient presents with acute on chronic worsening psychosis of unclear etiology. He is increasingly agitated and paranoid at home, experiencing significant distress around her paranoid delusions, feels unsafe at home. Would benefit from inpatient psychiatric admission for safety and stabilization. Family is in agreement with plan. Of note, patient will require an assistive walking device on the unit, can use a cane, walker, or wheelchair. Patient would also benefit from neurology consult for post-polio syndrome.     Plan:  - admit patient on 9.29  - boarding, awaiting a bed  - continue home medications simvastatin 40mg, synthroid 100mcg, iron, multivitamin  - hold seroquel at this time given poor efficacy  - PRN for agitation: haldol 5mg, ativan 1mg, benadryl 50mg IM/PO  - PRN for insomnia: seroquel 50mg   - defer standing medication to primary team  - recommend neurology consult on inpatient unit  - follow up [facility]

## 2024-08-21 NOTE — BH PATIENT PROFILE - FUNCTIONAL ASSESSMENT - BASIC MOBILITY 6.
3-calculated by average/Not able to assess (calculate score using [facility] averaging method)

## 2024-08-21 NOTE — BH INPATIENT PSYCHIATRY ASSESSMENT NOTE - NSBHCHARTREVIEWVS_PSY_A_CORE FT
Vital Signs Last 24 Hrs  T(C): 36.4 (08-21-24 @ 13:30), Max: 36.8 (08-21-24 @ 10:24)  T(F): 97.5 (08-21-24 @ 13:30), Max: 98.2 (08-21-24 @ 10:24)  HR: 75 (08-21-24 @ 12:33) (74 - 88)  BP: 105/64 (08-21-24 @ 12:33) (105/64 - 151/85)  BP(mean): --  RR: 16 (08-21-24 @ 13:30) (16 - 18)  SpO2: 97% (08-21-24 @ 12:33) (97% - 100%)    Orthostatic VS  08-21-24 @ 13:30  Lying BP: --/-- HR: --  Sitting BP: 136/89 HR: 78  Standing BP: 139/93 HR: 97  Site: --  Mode: --   Vital Signs Last 24 Hrs  T(C): 36.4 (08-22-24 @ 09:03), Max: 36.8 (08-21-24 @ 12:33)  T(F): 97.6 (08-22-24 @ 09:03), Max: 98.2 (08-21-24 @ 12:33)  HR: 75 (08-21-24 @ 12:33) (75 - 75)  BP: 105/64 (08-21-24 @ 12:33) (105/64 - 105/64)  BP(mean): --  RR: 16 (08-21-24 @ 13:30) (16 - 18)  SpO2: 97% (08-21-24 @ 12:33) (97% - 97%)    Orthostatic VS  08-22-24 @ 09:03  Lying BP: --/-- HR: --  Sitting BP: 120/69 HR: 103  Standing BP: --/-- HR: --  Site: --  Mode: --  Orthostatic VS  08-21-24 @ 13:30  Lying BP: --/-- HR: --  Sitting BP: 136/89 HR: 78  Standing BP: 139/93 HR: 97  Site: --  Mode: --   Vital Signs Last 24 Hrs  T(C): 36.4 (08-22-24 @ 09:03), Max: 36.4 (08-22-24 @ 09:03)  T(F): 97.6 (08-22-24 @ 09:03), Max: 97.6 (08-22-24 @ 09:03)  HR: --  BP: --  BP(mean): --  RR: --  SpO2: --    Orthostatic VS  08-22-24 @ 09:03  Lying BP: --/-- HR: --  Sitting BP: 120/69 HR: 103  Standing BP: --/-- HR: --  Site: --  Mode: --  Orthostatic VS  08-21-24 @ 13:30  Lying BP: --/-- HR: --  Sitting BP: 136/89 HR: 78  Standing BP: 139/93 HR: 97  Site: --  Mode: --

## 2024-08-21 NOTE — ED BEHAVIORAL HEALTH PROGRESS NOTE - NSBHMSEMOVE_PSY_A_CORE
Telephone Encounter by Diane Farrell RN at 05/30/17 10:19 AM     Author:  Diane Farrell RN Service:  (none) Author Type:  Registered Nurse     Filed:  05/30/17 10:19 AM Encounter Date:  5/30/2017 Status:  Signed     :  Diane Farrell RN (Registered Nurse)            Forward to Dr Dunlap[[id number]]      Revision History        User Key Date/Time User Provider Type Action    > KH1.1 05/30/17 10:19 AM Diane Farrell RN Registered Nurse Sign    M - Manual             No abnormal movements

## 2024-08-21 NOTE — ED ADULT NURSE REASSESSMENT NOTE - NS ED NURSE REASSESS COMMENT FT1
Pt A&Ox4 resting on stretcher. Respirations even and unlabored. Pt offers no complaints at this time. Food given to pt. Pt pending bed assignment at [facility]. Safety maintained.

## 2024-08-21 NOTE — BH INPATIENT PSYCHIATRY ASSESSMENT NOTE - OTHER PAST PSYCHIATRIC HISTORY (INCLUDE DETAILS REGARDING ONSET, COURSE OF ILLNESS, INPATIENT/OUTPATIENT TREATMENT)
PPH of schizophrenia (reportedly received this diagnosis in [place] when she was young), MDD, no prior hospitalizations, seen in [facility] for depressed mood in 10/23, referred to [facility], not currently in psychiatric treatment, taking seroquel 50mg qhs prescribed by PCP for insomnia, prior med trial of zoloft 25mg, no known history of SA/NSSIB, no legal history, no substance use coronavirus bivalent (EUA) Booster Vaccine (PFIZER)

## 2024-08-21 NOTE — PHYSICAL THERAPY INITIAL EVALUATION ADULT - PLANNED THERAPY INTERVENTIONS, PT EVAL
balance training/gait training/joint mobilization/manual therapy techniques/neuromuscular re-education/postural re-education/ROM/strengthening/stretching/transfer training

## 2024-08-21 NOTE — BH INPATIENT PSYCHIATRY ASSESSMENT NOTE - NSBHATTESTAPPBILLTIME_PSY_A_CORE
I attest my time as NEGIN is greater than 50% of the total combined time spent on qualifying patient care activities. I have reviewed and verified the documentation.

## 2024-08-22 LAB
CULTURE RESULTS: SIGNIFICANT CHANGE UP
SPECIMEN SOURCE: SIGNIFICANT CHANGE UP

## 2024-08-22 PROCEDURE — 99232 SBSQ HOSP IP/OBS MODERATE 35: CPT

## 2024-08-22 PROCEDURE — 99223 1ST HOSP IP/OBS HIGH 75: CPT | Mod: GC

## 2024-08-22 RX ORDER — POLYETHYLENE GLYCOL 3350 17 G/17G
17 POWDER, FOR SOLUTION ORAL DAILY
Refills: 0 | Status: DISCONTINUED | OUTPATIENT
Start: 2024-08-22 | End: 2024-08-30

## 2024-08-22 RX ORDER — POVIDONE, PROPYLENE GLYCOL 6.8; 3 MG/ML; MG/ML
1 LIQUID OPHTHALMIC
Refills: 0 | Status: DISCONTINUED | OUTPATIENT
Start: 2024-08-22 | End: 2024-08-30

## 2024-08-22 RX ORDER — IBUPROFEN 600 MG
400 TABLET ORAL EVERY 6 HOURS
Refills: 0 | Status: DISCONTINUED | OUTPATIENT
Start: 2024-08-22 | End: 2024-08-30

## 2024-08-22 RX ORDER — CALCIUM CARBONATE 500(1250)
1 TABLET ORAL EVERY 4 HOURS
Refills: 0 | Status: DISCONTINUED | OUTPATIENT
Start: 2024-08-22 | End: 2024-08-30

## 2024-08-22 RX ADMIN — Medication 100 MICROGRAM(S): at 06:10

## 2024-08-22 RX ADMIN — Medication 1 TABLET(S): at 09:01

## 2024-08-22 RX ADMIN — Medication 40 MILLIGRAM(S): at 09:01

## 2024-08-22 RX ADMIN — OLANZAPINE 5 MILLIGRAM(S): 7.5 TABLET ORAL at 20:54

## 2024-08-22 RX ADMIN — Medication 2 TABLET(S): at 20:52

## 2024-08-22 RX ADMIN — Medication 325 MILLIGRAM(S): at 09:01

## 2024-08-22 RX ADMIN — Medication 20 MILLIGRAM(S): at 20:52

## 2024-08-22 NOTE — PSYCHIATRIC REHAB INITIAL EVALUATION - NSBHPRRECOMMEND_PSY_ALL_CORE
Writer met with Pt to orient her to Psych Rehab department and its functions. Pt was receptive. Pt identified her primary reason of hospitalization as worsening of AH and paranoia. Pt was engaged, calm and cooperative during the session. Pt is on CO 1:1 for fall risk as she had polio in infancy.  Pt is with PPH of schizophrenia (reportedly received this diagnosis in [place] when she was young), MDD, no prior hospitalizations, seen in [facility] for depressed mood in 10/23. Zoer was able to establish a collaborative goal with Pt to work on the next seven days and encouraged her to attend groups. Pt was receptive. Psychiatric Rehabilitation staff will continue to provide encouragement, support, psychotherapy, and psychoeducation to assist the Pt in the progression of her treatment goal and engagement with activities.

## 2024-08-22 NOTE — BH INPATIENT PSYCHIATRY PROGRESS NOTE - NSBHMETABOLIC_PSY_ALL_CORE_FT
BMI: BMI (kg/m2): 27.7 (08-21-24 @ 13:30)  HbA1c:   Glucose: POCT Blood Glucose.: 193 mg/dL (08-20-24 @ 18:04)    BP: 105/64 (08-21-24 @ 12:33) (105/64 - 151/85)Vital Signs Last 24 Hrs  T(C): 36.4 (08-22-24 @ 09:03), Max: 36.4 (08-22-24 @ 09:03)  T(F): 97.6 (08-22-24 @ 09:03), Max: 97.6 (08-22-24 @ 09:03)  HR: --  BP: --  BP(mean): --  RR: --  SpO2: --    Orthostatic VS  08-22-24 @ 09:03  Lying BP: --/-- HR: --  Sitting BP: 120/69 HR: 103  Standing BP: --/-- HR: --  Site: --  Mode: --  Orthostatic VS  08-21-24 @ 13:30  Lying BP: --/-- HR: --  Sitting BP: 136/89 HR: 78  Standing BP: 139/93 HR: 97  Site: --  Mode: --    Lipid Panel:  BMI: BMI (kg/m2): 27.7 (08-21-24 @ 13:30)  HbA1c: A1C with Estimated Average Glucose Result: 6.3 % (08-23-24 @ 10:18)    Glucose: POCT Blood Glucose.: 193 mg/dL (08-20-24 @ 18:04)    BP: 105/64 (08-21-24 @ 12:33) (105/64 - 151/85)Vital Signs Last 24 Hrs  T(C): 36.8 (08-23-24 @ 07:58), Max: 36.8 (08-23-24 @ 07:58)  T(F): 98.2 (08-23-24 @ 07:58), Max: 98.2 (08-23-24 @ 07:58)  HR: --  BP: --  BP(mean): --  RR: 18 (08-23-24 @ 07:58) (18 - 18)  SpO2: --    Orthostatic VS  08-23-24 @ 07:58  Lying BP: --/-- HR: --  Sitting BP: 156/72 HR: 114  Standing BP: 150/70 HR: 112  Site: --  Mode: --  Orthostatic VS  08-22-24 @ 09:03  Lying BP: --/-- HR: --  Sitting BP: 120/69 HR: 103  Standing BP: --/-- HR: --  Site: --  Mode: --    Lipid Panel: Date/Time: 08-23-24 @ 10:18  Cholesterol, Serum: 205  LDL Cholesterol Calculated: 133  HDL Cholesterol, Serum: 46  Total Cholesterol/HDL Ration Measurement: --  Triglycerides, Serum: 130

## 2024-08-22 NOTE — CHART NOTE - NSCHARTNOTEFT_GEN_A_CORE
Neurology Consult Note    Referring Clinician: Paulette Roland NP    Sources of information: Patient, Primary Psychiatric Team, EMR	    Reason for Consult: Long-standing headaches    History of Present Illness: 59-year-old woman with PMHx of polio, patient did not walk until she was 12-years-old, admitted to [facility] for auditory hallucinations and paranoia. Neurology consulted for long-standing headaches.     On interview today, patient endorses headaches that are worse in the forehead bilaterally that wraps around to the back of her head since she was 25-years-old. Pain is a 7/10 currently, but 9/10 at worst. They occur about 1-3 times per week and last a few hours to a couple of days at most.  She describes it as a pressure or squeezing type of headache. They have not changed in location, quality, intensity, or frequency since they started. Usually patient takes excedrin and one dose brings the pain down to a 3/10 and the headache subsides from there. She denies any other symptoms associated with these headaches such as visual, auditory, or other sensory auras, and denies any nausea or vomiting or sensitivity to light, sound, or smells. She cannot identify any clear triggers for these headaches. She denies any confusion, acute loss of balance, muscle weakness, sensory changes, visual or auditory disturbances (aside from the auditory hallucinations that led to this admission), or seizures (both recently and in the past).     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  	•	HEENT: no vision or hearing changes. +headaches  	•	Resp: no cough or SOB.  	•	CV: no chest pain or palpitations  	•	GI: no N/V. +constipation, no diarrhea, blood in stool, no changes to frequency or urges   	•	: denies blood in urine or pain with urination; no blood in urine. +occasional urinary incontinence longstanding  	•	MSK: no pain or weakness in extremities. +Some left sided neck pain  	•	Derm: no new rashes or lesions.  	•	Neuro: no newly onset confusion, concern for getting lost, or word-finding difficulty. +gait imbalance, no seizures.    Family History: none    Social History:  	•	Single, lives with sister, on disability, no dependents    Physical Exam:  VS: T 97.6°F, , /69  Gen: Well-developed, alert and in no acute distress.    HEENT: Normocephalic and atraumatic.   Pulmonary: Breathing comfortably on room air and speaking full sentences.  Extremities: No edema of distal upper or lower extremities. No clubbing or cyanosis of digits.     Neurological Exam:    Mental Status: Alert and oriented to person, place (could identify she is in a hospital in [facility], but not city or specific hospital name), month and day, and situation. Not able to identify year due to lack of education, "I was never taught the years because I didn't go to school"    Cranial Nerves:  CN II – PERRLA, visual fields intact  CN III, IV, VI – Extraocular movements intact without nystagmus.  CN V – V1-V3 sensation intact and equal bilaterally   CN VII – No facial asymmetry. Able to smile, raise eyebrows, close eyes, and puff cheeks symmetrically and bilaterally against resistance.   CN VIII – Hearing intact bilaterally to finger rub.  CN IX, X – Palate elevates symmetrically bilaterally. Uvula is midline.  CN XI – Shoulder shrug and head turn intact and symmetric bilaterally.  CN XII – Tongue midline without deviation.      Speech: Normal rate, volume, rhythm, no dysarthria noted.    Coordination: Some slowing on rapid alternating movements. Symmetric slowing on finger and toe tapping. Finger to nose intact though slow and mildly inaccurate.     Sensory: Intact sensation to sensation to light touch in all extremities.    Motor: Normal bulk and tone. Strength 5/5 in upper and lower extremities. No asterixis noted with outstretched hands. No rigidity bilaterally. No bradykinesia noted. No myoclonus or tremor noted at rest. No action tremor noted.     Gait and balance: Gait with walker assistance, slow and with short stride length. Unable to walk without walker. Romberg, pull test, heel and toe walking not tested due to lack of balance.     Imaging:    EXAM:  CT BRAIN without contrast    INTERPRETATION:  CLINICAL INFORMATION: delusion paranoia r/o organic   causes    COMPARISON: None.    CONTRAST:  IV Contrast:  Complications:    TECHNIQUE:  Serial axial images were obtained from the skull base to the   vertex using multi-slice helical technique. Sagittal and coronal   reformats were obtained.    FINDINGS:    VENTRICLES AND SULCI: Normal in size and configuration.  INTRA-AXIAL: No mass effect, acute hemorrhage, or midline shift.  There   are periventricular and subcortical white matter hypodensities,   consistent with microvascular type changes.  EXTRA-AXIAL: No mass or fluid collection. Basal cisterns are normal in   appearance.    VISUALIZED SINUSES:  Clear.  TYMPANOMASTOID CAVITIES:  Clear.  VISUALIZED ORBITS: Normal.  CALVARIUM: Intact.    MISCELLANEOUS: None.      IMPRESSION: No impression provided by radiologist, but per our read, there is some concerning cerebellar atrophy and periventricular and subcortical white matter hypodensities are concerning and warrant further investigation with MRI brain w/ and w/o contrast.      Assessment: 59-year-old woman with PMHx of polio, patient did not walk until she was 12-years-old, admitted to [facility] for auditory hallucinations and paranoia. Neurology consulted for long-standing headaches. These headaches are most consistent with tension-type headache, as there are no migrainous features, and they occur bilaterally and are described as a squeezing/pressure sensation. Excedrin has helped patient in the past, so we are recommending ibuprofen PRN (see below). Patient’s CT brain findings of cerebellar atrophy and white matter hypodensities were concerning, and though they likely are not related to the headaches, should be investigated further with MRI brain with and without contrast.     Recommendations:  • Recommend ibuprofen 400 q6h PRN for headaches  • Recommend MRI brain with and without gadolinium    Brett Mariee MD  [phone] Neurology Consult Note    Referring Clinician: Paulette Roland NP    Sources of information: Patient, Primary Psychiatric Team, EMR	    Reason for Consult: Long-standing headaches    History of Present Illness: 59-year-old woman with PMHx of polio, patient did not walk until she was 12-years-old, admitted to [facility] for auditory hallucinations and paranoia. Neurology consulted for long-standing headaches.     On interview today, patient endorses headaches that are worse in the forehead bilaterally that wraps around to the back of her head since she was 25-years-old. Pain is a 7/10 currently, but 9/10 at worst. They occur about 1-3 times per week and last a few hours to a couple of days at most.  She describes it as a pressure or squeezing type of headache. They have not changed in location, quality, intensity, or frequency since they started. Usually patient takes excedrin and one dose brings the pain down to a 3/10 and the headache subsides from there. She denies any other symptoms associated with these headaches such as visual, auditory, or other sensory auras, and denies any nausea or vomiting or sensitivity to light, sound, or smells. She cannot identify any clear triggers for these headaches. She denies any confusion, acute loss of balance, muscle weakness, sensory changes, visual or auditory disturbances (aside from the auditory hallucinations that led to this admission), or seizures (both recently and in the past).     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  	•	HEENT: no vision or hearing changes. +headaches  	•	Resp: no cough or SOB.  	•	CV: no chest pain or palpitations  	•	GI: no N/V. +constipation, no diarrhea, blood in stool, no changes to frequency or urges   	•	: denies blood in urine or pain with urination; no blood in urine. +occasional urinary incontinence longstanding  	•	MSK: no pain or weakness in extremities. +Some left sided neck pain  	•	Derm: no new rashes or lesions.  	•	Neuro: no newly onset confusion, concern for getting lost, or word-finding difficulty. +gait imbalance, no seizures.    Family History: none    Social History:  	•	Single, lives with sister, on disability, no dependents    Physical Exam:  VS: T 97.6°F, , /69  Gen: Well-developed, alert and in no acute distress.    HEENT: Normocephalic and atraumatic.   Pulmonary: Breathing comfortably on room air and speaking full sentences.  Extremities: No edema of distal upper or lower extremities. No clubbing or cyanosis of digits.     Neurological Exam:    Mental Status: Alert and oriented to person, place (could identify she is in a hospital in [facility], but not city or specific hospital name), month and day, and situation. Not able to identify year due to lack of education, "I was never taught the years because I didn't go to school"    Cranial Nerves:  CN II – PERRLA, visual fields intact  CN III, IV, VI – Upwards gaze palsy more prominent on left eye, potentially related to poor effort/cooperation. Mild rightwards gaze palsy on both eyes, also potentially related to poor effort/cooperation. Otherwise extraocular movements intact without nystagmus.  CN V – V1-V3 sensation intact and equal bilaterally   CN VII – No facial asymmetry. Able to smile, raise eyebrows, close eyes, and puff cheeks symmetrically and bilaterally against resistance.   CN VIII – Hearing intact bilaterally to finger rub.  CN IX, X – Palate elevates symmetrically bilaterally. Uvula is midline.  CN XI – Shoulder shrug and head turn intact and symmetric bilaterally.  CN XII – Tongue midline without deviation.      Speech: Normal rate, volume, rhythm, no dysarthria noted.    Coordination: Moderate slowing on rapid alternating movements, equal bilaterally. Moderate slowing on finger and toe tapping, equal bilaterally. Finger to nose intact bilaterally, though slow and mildly inaccurate.     Sensory: Intact sensation to sensation to light touch in all extremities.    Motor: Normal bulk and tone. Strength 5/5 in upper and lower extremities. No asterixis noted with outstretched hands. No rigidity bilaterally. No bradykinesia noted. No myoclonus or tremor noted at rest. No action tremor noted.     Gait and balance: Gait with walker assistance, slow and with short stride length. Unable to walk without walker.     Imaging:    EXAM:  CT BRAIN without contrast    INTERPRETATION:  CLINICAL INFORMATION: delusion paranoia r/o organic   causes    COMPARISON: None.    CONTRAST:  IV Contrast:  Complications:    TECHNIQUE:  Serial axial images were obtained from the skull base to the   vertex using multi-slice helical technique. Sagittal and coronal   reformats were obtained.    FINDINGS:    VENTRICLES AND SULCI: Normal in size and configuration.  INTRA-AXIAL: No mass effect, acute hemorrhage, or midline shift.  There   are periventricular and subcortical white matter hypodensities,   consistent with microvascular type changes.  EXTRA-AXIAL: No mass or fluid collection. Basal cisterns are normal in   appearance.    VISUALIZED SINUSES:  Clear.  TYMPANOMASTOID CAVITIES:  Clear.  VISUALIZED ORBITS: Normal.  CALVARIUM: Intact.    MISCELLANEOUS: None.      IMPRESSION: No impression provided by radiologist, but per our read, there is some concerning cerebellar atrophy and periventricular and subcortical white matter hypodensities are concerning and warrant further investigation with MRI brain w/ and w/o contrast.      Assessment: 59-year-old woman with PMHx of polio, patient did not walk until she was 12-years-old, admitted to [facility] for auditory hallucinations and paranoia. Neurology consulted for long-standing headaches. These headaches are most consistent with tension-type headache, as there are no migrainous features, and they occur bilaterally and are described as a squeezing/pressure sensation. Excedrin has helped patient in the past, so we are recommending ibuprofen PRN (see below). Patient’s CT brain findings of cerebellar atrophy and white matter hypodensities were concerning, and though they likely are not related to the headaches, should be investigated further with MRI brain with and without contrast.     Recommendations:  • Recommend ibuprofen 400 q6h PRN for headaches  • Recommend MRI brain with and without gadolinium    Brett Mariee MD  [phone] Neurology Consult Note    Referring Clinician: Paulette Roland NP    Sources of information: Patient, Primary Psychiatric Team, EMR	    Reason for Consult: Long-standing headaches    History of Present Illness: 59-year-old woman with PMHx of polio, patient did not walk until she was 12-years-old, admitted to [facility] for auditory hallucinations and paranoia. Neurology consulted for long-standing headaches.     On interview today, patient endorses headaches that are worse in the forehead bilaterally that wraps around to the back of her head since she was 25-years-old. Pain is a 7/10 currently, but 9/10 at worst. They occur about 1-3 times per week and last a few hours to a couple of days at most.  She describes it as a pressure or squeezing type of headache. They have not changed in location, quality, intensity, or frequency since they started. Usually patient takes excedrin and one dose brings the pain down to a 3/10 and the headache subsides from there. She denies any other symptoms associated with these headaches such as visual, auditory, or other sensory auras, and denies any nausea or vomiting or sensitivity to light, sound, or smells. She cannot identify any clear triggers for these headaches. She denies any confusion, acute loss of balance, muscle weakness, sensory changes, visual or auditory disturbances (aside from the auditory hallucinations that led to this admission), or seizures (both recently and in the past).     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  	•	HEENT: no vision or hearing changes. +headaches  	•	Resp: no cough or SOB.  	•	CV: no chest pain or palpitations  	•	GI: no N/V. +constipation, no diarrhea, blood in stool, no changes to frequency or urges   	•	: denies blood in urine or pain with urination; no blood in urine. +occasional urinary incontinence longstanding  	•	MSK: no pain or weakness in extremities. +Some left sided neck pain  	•	Derm: no new rashes or lesions.  	•	Neuro: no newly onset confusion, concern for getting lost, or word-finding difficulty. +gait imbalance, no seizures.    Family History: none    Social History:  	•	Single, lives with sister, on disability, no dependents    Physical Exam:  VS: T 97.6°F, , /69  Gen: Well-developed, alert and in no acute distress.    HEENT: Normocephalic and atraumatic.   Pulmonary: Breathing comfortably on room air and speaking full sentences.  Extremities: No edema of distal upper or lower extremities. No clubbing or cyanosis of digits.     Neurological Exam:    Mental Status: Alert and oriented to person, place (could identify she is in a hospital in [facility], but not city or specific hospital name), month and day, and situation. Not able to identify year due to lack of education, "I was never taught the years because I didn't go to school"    Cranial Nerves:  CN II – PERRLA, visual fields intact  CN III, IV, VI – Upwards gaze palsy more prominent on left eye, potentially related to poor effort/cooperation. Mild rightwards gaze palsy on both eyes, also potentially related to poor effort/cooperation. Otherwise extraocular movements intact without nystagmus.  CN V – V1-V3 sensation intact and equal bilaterally   CN VII – No facial asymmetry. Able to smile, raise eyebrows, close eyes, and puff cheeks symmetrically and bilaterally against resistance.   CN VIII – Hearing intact bilaterally to finger rub.  CN IX, X – Palate elevates symmetrically bilaterally. Uvula is midline.  CN XI – Shoulder shrug and head turn intact and symmetric bilaterally.  CN XII – Tongue midline without deviation.      Speech: Normal rate, volume, rhythm, no dysarthria noted.    Coordination: Moderate slowing on rapid alternating movements, equal bilaterally. Moderate slowing on finger and toe tapping, equal bilaterally. Finger to nose intact bilaterally, though slow and mildly inaccurate.     Sensory: Intact sensation to sensation to light touch in all extremities.    Motor: Normal bulk and tone. Strength 5/5 in upper and lower extremities. No asterixis noted with outstretched hands. No rigidity bilaterally. No bradykinesia noted. No myoclonus or tremor noted at rest. No action tremor noted.     Gait and balance: Gait with walker assistance, slow and with short stride length. Unable to walk without walker.     Imaging:    EXAM:  CT BRAIN without contrast    INTERPRETATION:  CLINICAL INFORMATION: delusion paranoia r/o organic   causes    COMPARISON: None.    CONTRAST:  IV Contrast:  Complications:    TECHNIQUE:  Serial axial images were obtained from the skull base to the   vertex using multi-slice helical technique. Sagittal and coronal   reformats were obtained.    FINDINGS:    VENTRICLES AND SULCI: Normal in size and configuration.  INTRA-AXIAL: No mass effect, acute hemorrhage, or midline shift.  There   are periventricular and subcortical white matter hypodensities,   consistent with microvascular type changes.  EXTRA-AXIAL: No mass or fluid collection. Basal cisterns are normal in   appearance.    VISUALIZED SINUSES:  Clear.  TYMPANOMASTOID CAVITIES:  Clear.  VISUALIZED ORBITS: Normal.  CALVARIUM: Intact.    MISCELLANEOUS: None.    IMPRESSION: No impression provided by radiologist, but per our read, there is some concerning cerebellar atrophy and periventricular and subcortical white matter hypodensities are concerning and warrant further investigation with MRI brain w/ and w/o contrast.    Assessment: 59-year-old woman with PMHx of polio, patient did not walk until she was 12-years-old, admitted to [facility] for auditory hallucinations and paranoia. Neurology consulted for long-standing headaches. These headaches are most consistent with tension-type headache, as there are no migrainous features, and they occur bilaterally and are described as a squeezing/pressure sensation. Excedrin has helped patient in the past, so we are recommending ibuprofen PRN (see below). Patient’s CT brain findings of cerebellar atrophy and white matter hypodensities were concerning, and though they likely are not related to the headaches, should be investigated further with MRI brain with and without contrast.     Recommendations:  • Recommend ibuprofen 400 q6h PRN for headaches  • Recommend MRI brain with and without gadolinium    Brett Mariee MD  [phone]      I interviewed the patient, discussed the case with the Resident and agree with the findings and plan as documented in the Resident's note except below.    CT head on 8/20/2024 to my eye did not show any acute pathology but consistent with cerebellar atrophy and extensive subcortical and periventricular white matter hypodensities.     Continue medical management, neuro- check and fall precaution.  GI and DVT prophylaxis.  I discussed the diagnosis and treatment plan with the patient.  All questions and concerns were addressed. The patient demonstrated good understanding of the treatment plan.  My cumulative time taking care of this patient is 85 minutes  If you have any further questions, please do not hesitate to contact our consult service.  Thank you for allowing us to participate in this patient care.

## 2024-08-22 NOTE — PHARMACOTHERAPY INTERVENTION NOTE - COMMENTS
In monitoring patients on levothyroxine for drug-drug interactions and timing of administration, identified patient with an order for calcium carbonate + vitamin D, ferrous sulfate, and multivitamin/minerals, which contains calcium and iron, scheduled for daily at 0900. Levothyroxine defaults to daily at 0600. Concurrent use of levothyroxine and iron-, aluminum-, calcium- or magnesium-containing products may result in decreased levothyroxine absorption and must be  from levothyroxine by at least 4 hours.     Recommended adjusting dosing schedule for carbonate + vitamin D, ferrous sulfate, and multivitamin/minerals to bedtime to avoid the interaction, as clinically appropriate.    Samantha Franklin, PharmD, BCPP   In monitoring patients on levothyroxine for drug-drug interactions and timing of administration, identified patient with an order for calcium carbonate + vitamin D, ferrous sulfate, and multivitamin/minerals, which contains calcium and iron, scheduled for daily at 0900. Levothyroxine defaults to daily at 0600. Concurrent use of levothyroxine and iron-, aluminum-, calcium- or magnesium-containing products may result in decreased levothyroxine absorption and must be  from levothyroxine by at least 4 hours.     Recommended adjusting dosing schedule for carbonate + vitamin D, ferrous sulfate, and multivitamin/minerals to bedtime to avoid the interaction, as clinically appropriate.    8/27/24: f/u with covering provider    Samantha Franklin PharmD, BCPP   In monitoring patients on levothyroxine for drug-drug interactions and timing of administration, identified patient with an order for calcium carbonate + vitamin D, ferrous sulfate, and multivitamin/minerals, which contains calcium and iron, scheduled for daily at 0900. Levothyroxine defaults to daily at 0600. Concurrent use of levothyroxine and iron-, aluminum-, calcium- or magnesium-containing products may result in decreased levothyroxine absorption and must be  from levothyroxine by at least 4 hours.     Recommended adjusting dosing schedule for carbonate + vitamin D, ferrous sulfate, and multivitamin/minerals to bedtime to avoid the interaction, as clinically appropriate.    8/27/24: f/u with covering provider    8/28/24: accepted by covering provider    Samantha Franklin, BonD, BCPP

## 2024-08-22 NOTE — BH INPATIENT PSYCHIATRY PROGRESS NOTE - CURRENT MEDICATION
MEDICATIONS  (STANDING):  atorvastatin 20 milliGRAM(s) Oral at bedtime  calcium carbonate 1250 mG  + Vitamin D (OsCal 500 + D) 1 Tablet(s) Oral daily  ferrous    sulfate 325 milliGRAM(s) Oral daily  levothyroxine 100 MICROGram(s) Oral daily  multivitamin/minerals 1 Tablet(s) Oral daily  OLANZapine Disintegrating Tablet 5 milliGRAM(s) Oral at bedtime  pantoprazole    Tablet 40 milliGRAM(s) Oral before breakfast  senna 2 Tablet(s) Oral at bedtime    MEDICATIONS  (PRN):  acetaminophen     Tablet .. 650 milliGRAM(s) Oral every 6 hours PRN Mild Pain (1 - 3), Moderate Pain (4 - 6), Severe Pain (7 - 10)  aluminum hydroxide/magnesium hydroxide/simethicone Suspension 30 milliLiter(s) Oral every 6 hours PRN Dyspepsia  artificial  tears Solution 1 Drop(s) Both EYES two times a day PRN Dry Eyes  haloperidol     Tablet 5 milliGRAM(s) Oral every 6 hours PRN agitation  haloperidol    Injectable 5 milliGRAM(s) IntraMuscular once PRN severe agitation  ibuprofen  Tablet. 400 milliGRAM(s) Oral every 6 hours PRN Moderate Pain (4 - 6), Severe Pain (7 - 10)  LORazepam     Tablet 1 milliGRAM(s) Oral every 6 hours PRN Agitation  LORazepam   Injectable 1 milliGRAM(s) IntraMuscular once PRN severe agitation   MEDICATIONS  (STANDING):  atorvastatin 20 milliGRAM(s) Oral at bedtime  calcium carbonate 1250 mG  + Vitamin D (OsCal 500 + D) 1 Tablet(s) Oral daily  ferrous    sulfate 325 milliGRAM(s) Oral daily  levothyroxine 100 MICROGram(s) Oral daily  multivitamin/minerals 1 Tablet(s) Oral daily  OLANZapine Disintegrating Tablet 10 milliGRAM(s) Oral at bedtime  pantoprazole    Tablet 40 milliGRAM(s) Oral before breakfast  polyethylene glycol 3350 17 Gram(s) Oral daily  senna 2 Tablet(s) Oral at bedtime    MEDICATIONS  (PRN):  acetaminophen     Tablet .. 650 milliGRAM(s) Oral every 6 hours PRN Mild Pain (1 - 3), Moderate Pain (4 - 6), Severe Pain (7 - 10)  aluminum hydroxide/magnesium hydroxide/simethicone Suspension 30 milliLiter(s) Oral every 6 hours PRN Dyspepsia  artificial  tears Solution 1 Drop(s) Both EYES two times a day PRN Dry Eyes  calcium carbonate    500 mG (Tums) Chewable 1 Tablet(s) Chew every 4 hours PRN Acid reflux  haloperidol     Tablet 5 milliGRAM(s) Oral every 6 hours PRN agitation  haloperidol    Injectable 5 milliGRAM(s) IntraMuscular once PRN severe agitation  ibuprofen  Tablet. 400 milliGRAM(s) Oral every 6 hours PRN Moderate Pain (4 - 6), Severe Pain (7 - 10)  LORazepam     Tablet 1 milliGRAM(s) Oral every 6 hours PRN Agitation  LORazepam   Injectable 1 milliGRAM(s) IntraMuscular once PRN severe agitation

## 2024-08-22 NOTE — PSYCHIATRIC REHAB INITIAL EVALUATION - NSBHSIGPRIORMED_PSY_ALL_CORE
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

## 2024-08-22 NOTE — BH INPATIENT PSYCHIATRY PROGRESS NOTE - NSBHCHARTREVIEWVS_PSY_A_CORE FT
Vital Signs Last 24 Hrs  T(C): 36.4 (08-22-24 @ 09:03), Max: 36.4 (08-22-24 @ 09:03)  T(F): 97.6 (08-22-24 @ 09:03), Max: 97.6 (08-22-24 @ 09:03)  HR: --  BP: --  BP(mean): --  RR: --  SpO2: --    Orthostatic VS  08-22-24 @ 09:03  Lying BP: --/-- HR: --  Sitting BP: 120/69 HR: 103  Standing BP: --/-- HR: --  Site: --  Mode: --  Orthostatic VS  08-21-24 @ 13:30  Lying BP: --/-- HR: --  Sitting BP: 136/89 HR: 78  Standing BP: 139/93 HR: 97  Site: --  Mode: --   Vital Signs Last 24 Hrs  T(C): 36.8 (08-23-24 @ 07:58), Max: 36.8 (08-23-24 @ 07:58)  T(F): 98.2 (08-23-24 @ 07:58), Max: 98.2 (08-23-24 @ 07:58)  HR: --  BP: --  BP(mean): --  RR: 18 (08-23-24 @ 07:58) (18 - 18)  SpO2: --    Orthostatic VS  08-23-24 @ 07:58  Lying BP: --/-- HR: --  Sitting BP: 156/72 HR: 114  Standing BP: 150/70 HR: 112  Site: --  Mode: --  Orthostatic VS  08-22-24 @ 09:03  Lying BP: --/-- HR: --  Sitting BP: 120/69 HR: 103  Standing BP: --/-- HR: --  Site: --  Mode: --

## 2024-08-22 NOTE — PHARMACOTHERAPY INTERVENTION NOTE - COMMENTS
Concurrent administration of levothyroxine and other medications within 1 hour may result in decreased levothyroxine effectiveness.    Since levothyroxine administration time defaults to 0600 and pantoprazole administration time defaults to 0730, there is the potential for the medications to be administered together. Added administration instruction to pantoprazole order to "Administer at least 1 hour AFTER levothyroxine."    Samantha Franklin, BonD, BCPP

## 2024-08-23 ENCOUNTER — APPOINTMENT (OUTPATIENT)
Dept: CT IMAGING | Facility: IMAGING CENTER | Age: 59
End: 2024-08-23

## 2024-08-23 ENCOUNTER — APPOINTMENT (OUTPATIENT)
Dept: RADIOLOGY | Facility: IMAGING CENTER | Age: 59
End: 2024-08-23

## 2024-08-23 ENCOUNTER — APPOINTMENT (OUTPATIENT)
Dept: MAMMOGRAPHY | Facility: IMAGING CENTER | Age: 59
End: 2024-08-23

## 2024-08-23 LAB
A1C WITH ESTIMATED AVERAGE GLUCOSE RESULT: 6.3 % — HIGH (ref 4–5.6)
CHOLEST SERPL-MCNC: 205 MG/DL — HIGH
ESTIMATED AVERAGE GLUCOSE: 134 — SIGNIFICANT CHANGE UP
HDLC SERPL-MCNC: 46 MG/DL — LOW
LIPID PNL WITH DIRECT LDL SERPL: 133 MG/DL — HIGH
NON HDL CHOLESTEROL: 159 MG/DL — HIGH
TRIGL SERPL-MCNC: 130 MG/DL — SIGNIFICANT CHANGE UP

## 2024-08-23 PROCEDURE — 99232 SBSQ HOSP IP/OBS MODERATE 35: CPT

## 2024-08-23 RX ORDER — OLANZAPINE 7.5 MG/1
10 TABLET ORAL AT BEDTIME
Refills: 0 | Status: DISCONTINUED | OUTPATIENT
Start: 2024-08-23 | End: 2024-08-30

## 2024-08-23 RX ORDER — CALCIUM CARBONATE/VITAMIN D3 500MG-5MCG
1 TABLET ORAL DAILY
Refills: 0 | Status: DISCONTINUED | OUTPATIENT
Start: 2024-08-23 | End: 2024-08-28

## 2024-08-23 RX ORDER — LEVOTHYROXINE SODIUM 100 MCG
100 TABLET ORAL
Refills: 0 | Status: DISCONTINUED | OUTPATIENT
Start: 2024-08-23 | End: 2024-08-30

## 2024-08-23 RX ADMIN — Medication 1 TABLET(S): at 08:16

## 2024-08-23 RX ADMIN — Medication 40 MILLIGRAM(S): at 08:16

## 2024-08-23 RX ADMIN — Medication 20 MILLIGRAM(S): at 21:29

## 2024-08-23 RX ADMIN — Medication 2 TABLET(S): at 21:28

## 2024-08-23 RX ADMIN — Medication 400 MILLIGRAM(S): at 21:31

## 2024-08-23 RX ADMIN — OLANZAPINE 10 MILLIGRAM(S): 7.5 TABLET ORAL at 21:28

## 2024-08-23 RX ADMIN — Medication 100 MICROGRAM(S): at 05:21

## 2024-08-23 RX ADMIN — Medication 325 MILLIGRAM(S): at 08:16

## 2024-08-23 RX ADMIN — Medication 1 TABLET(S): at 08:17

## 2024-08-23 NOTE — BH INPATIENT PSYCHIATRY PROGRESS NOTE - NSBHMETABOLIC_PSY_ALL_CORE_FT
BMI: BMI (kg/m2): 27.7 (08-21-24 @ 13:30)  HbA1c: A1C with Estimated Average Glucose Result: 6.3 % (08-23-24 @ 10:18)    Glucose: POCT Blood Glucose.: 193 mg/dL (08-20-24 @ 18:04)    BP: 105/64 (08-21-24 @ 12:33) (105/64 - 151/85)Vital Signs Last 24 Hrs  T(C): 36.8 (08-23-24 @ 07:58), Max: 36.8 (08-23-24 @ 07:58)  T(F): 98.2 (08-23-24 @ 07:58), Max: 98.2 (08-23-24 @ 07:58)  HR: --  BP: --  BP(mean): --  RR: 18 (08-23-24 @ 07:58) (18 - 18)  SpO2: --    Orthostatic VS  08-23-24 @ 07:58  Lying BP: --/-- HR: --  Sitting BP: 156/72 HR: 114  Standing BP: 150/70 HR: 112  Site: --  Mode: --  Orthostatic VS  08-22-24 @ 09:03  Lying BP: --/-- HR: --  Sitting BP: 120/69 HR: 103  Standing BP: --/-- HR: --  Site: --  Mode: --    Lipid Panel: Date/Time: 08-23-24 @ 10:18  Cholesterol, Serum: 205  LDL Cholesterol Calculated: 133  HDL Cholesterol, Serum: 46  Total Cholesterol/HDL Ration Measurement: --  Triglycerides, Serum: 130   BMI: BMI (kg/m2): 27.7 (08-21-24 @ 13:30)  HbA1c: A1C with Estimated Average Glucose Result: 6.3 % (08-23-24 @ 10:18)    Glucose: POCT Blood Glucose.: 193 mg/dL (08-20-24 @ 18:04)    BP: --Vital Signs Last 24 Hrs  T(C): 36.2 (08-25-24 @ 06:38), Max: 36.2 (08-25-24 @ 06:38)  T(F): 97.2 (08-25-24 @ 06:38), Max: 97.2 (08-25-24 @ 06:38)  HR: --  BP: --  BP(mean): --  RR: 19 (08-25-24 @ 06:38) (19 - 19)  SpO2: --    Orthostatic VS  08-25-24 @ 06:38  Lying BP: --/-- HR: --  Sitting BP: 138/79 HR: 74  Standing BP: 130/76 HR: 80  Site: --  Mode: --  Orthostatic VS  08-24-24 @ 07:53  Lying BP: 114/62 HR: 83  Sitting BP: --/-- HR: --  Standing BP: --/-- HR: --  Site: --  Mode: --    Lipid Panel: Date/Time: 08-23-24 @ 10:18  Cholesterol, Serum: 205  LDL Cholesterol Calculated: 133  HDL Cholesterol, Serum: 46  Total Cholesterol/HDL Ration Measurement: --  Triglycerides, Serum: 130

## 2024-08-23 NOTE — BH INPATIENT PSYCHIATRY PROGRESS NOTE - NSBHATTESTAPPBILLTIME_PSY_A_CORE
I attest my time as NEGIN is greater than 50% of the total combined time spent on qualifying patient care activities. I have reviewed and verified the documentation.
I attest my time as NEGIN is greater than 50% of the total combined time spent on qualifying patient care activities. I have reviewed and verified the documentation.

## 2024-08-23 NOTE — BH SOCIAL WORK INITIAL PSYCHOSOCIAL EVALUATION - OTHER PAST PSYCHIATRIC HISTORY (INCLUDE DETAILS REGARDING ONSET, COURSE OF ILLNESS, INPATIENT/OUTPATIENT TREATMENT)
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12), HLD, hypothyroidism, chronic pain, PPHx of schizophrenia (reportedly received this diagnosis in [place] when she was young), MDD, no prior hospitalizations, seen in [facility] for depressed mood in 10/23, referred to [facility], not currently in psychiatric treatment, taking seroquel 50mg qhs prescribed by PCP for insomnia, prior med trial of zoloft 25mg, no known history of SA/NSSIB, no legal history, no substance use, presented to [facility] ED brought in by family for worsening paranoia, hallucinations and agitation at home.       Reports Patient has had aide but is seeking help to get more services. Reports in the past mom was taking care of her, and had 4 hours. However, since mother passed insurance still wont approve Patient to get more hours. Reports Patient has become more paranoid, anxious, afraid to leave to house for the last 6-8 months. Reports Patient has endorses [facility] + [facility]. Reports having to call ambulance before admission due to Patient's increased paranoia. No reports SI/HI from Patient. Greatest concern is Patient being home alone without proper aides/supports in place.    Writer received VM from Bozena, Patient's care coordinator at [facility] through [facility]. Writer called and left VM to return call. 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12), HLD, hypothyroidism, chronic pain, PPHx of schizophrenia (reportedly received this diagnosis in [place] when she was young), MDD, no prior hospitalizations, seen in [facility] for depressed mood in 10/23, referred to [facility], not currently in psychiatric treatment, taking seroquel 50mg qhs prescribed by PCP for insomnia, prior med trial of zoloft 25mg, no known history of SA/NSSIB, no legal history, no substance use, presented to [facility] ED brought in by family for worsening paranoia, hallucinations and agitation at home. Patient now admitted to  for psychiatric stabilization.  Per chart review, Patient was no in any formal treatment prior to visit in [facility].

## 2024-08-23 NOTE — BH INPATIENT PSYCHIATRY PROGRESS NOTE - PRN MEDS
MEDICATIONS  (PRN):  acetaminophen     Tablet .. 650 milliGRAM(s) Oral every 6 hours PRN Mild Pain (1 - 3), Moderate Pain (4 - 6), Severe Pain (7 - 10)  aluminum hydroxide/magnesium hydroxide/simethicone Suspension 30 milliLiter(s) Oral every 6 hours PRN Dyspepsia  artificial  tears Solution 1 Drop(s) Both EYES two times a day PRN Dry Eyes  calcium carbonate    500 mG (Tums) Chewable 1 Tablet(s) Chew every 4 hours PRN Acid reflux  haloperidol     Tablet 5 milliGRAM(s) Oral every 6 hours PRN agitation  haloperidol    Injectable 5 milliGRAM(s) IntraMuscular once PRN severe agitation  ibuprofen  Tablet. 400 milliGRAM(s) Oral every 6 hours PRN Moderate Pain (4 - 6), Severe Pain (7 - 10)  LORazepam     Tablet 1 milliGRAM(s) Oral every 6 hours PRN Agitation  LORazepam   Injectable 1 milliGRAM(s) IntraMuscular once PRN severe agitation   MEDICATIONS  (PRN):  acetaminophen     Tablet .. 650 milliGRAM(s) Oral every 6 hours PRN Mild Pain (1 - 3), Moderate Pain (4 - 6), Severe Pain (7 - 10)  aluminum hydroxide/magnesium hydroxide/simethicone Suspension 30 milliLiter(s) Oral every 6 hours PRN Dyspepsia  artificial  tears Solution 1 Drop(s) Both EYES two times a day PRN Dry Eyes  calcium carbonate    500 mG (Tums) Chewable 1 Tablet(s) Chew every 4 hours PRN Acid reflux  haloperidol     Tablet 5 milliGRAM(s) Oral every 6 hours PRN agitation  haloperidol    Injectable 5 milliGRAM(s) IntraMuscular once PRN severe agitation  ibuprofen  Tablet. 400 milliGRAM(s) Oral every 6 hours PRN Moderate Pain (4 - 6), Severe Pain (7 - 10)  LORazepam     Tablet 1 milliGRAM(s) Oral every 6 hours PRN Agitation  LORazepam   Injectable 1 milliGRAM(s) IntraMuscular once PRN severe agitation  petrolatum white Ointment 1 Application(s) Topical daily PRN rash

## 2024-08-23 NOTE — BH INPATIENT PSYCHIATRY PROGRESS NOTE - NSBHCHARTREVIEWVS_PSY_A_CORE FT
Vital Signs Last 24 Hrs  T(C): 36.8 (08-23-24 @ 07:58), Max: 36.8 (08-23-24 @ 07:58)  T(F): 98.2 (08-23-24 @ 07:58), Max: 98.2 (08-23-24 @ 07:58)  HR: --  BP: --  BP(mean): --  RR: 18 (08-23-24 @ 07:58) (18 - 18)  SpO2: --    Orthostatic VS  08-23-24 @ 07:58  Lying BP: --/-- HR: --  Sitting BP: 156/72 HR: 114  Standing BP: 150/70 HR: 112  Site: --  Mode: --  Orthostatic VS  08-22-24 @ 09:03  Lying BP: --/-- HR: --  Sitting BP: 120/69 HR: 103  Standing BP: --/-- HR: --  Site: --  Mode: --   Vital Signs Last 24 Hrs  T(C): 36.2 (08-25-24 @ 06:38), Max: 36.2 (08-25-24 @ 06:38)  T(F): 97.2 (08-25-24 @ 06:38), Max: 97.2 (08-25-24 @ 06:38)  HR: --  BP: --  BP(mean): --  RR: 19 (08-25-24 @ 06:38) (19 - 19)  SpO2: --    Orthostatic VS  08-25-24 @ 06:38  Lying BP: --/-- HR: --  Sitting BP: 138/79 HR: 74  Standing BP: 130/76 HR: 80  Site: --  Mode: --  Orthostatic VS  08-24-24 @ 07:53  Lying BP: 114/62 HR: 83  Sitting BP: --/-- HR: --  Standing BP: --/-- HR: --  Site: --  Mode: --

## 2024-08-23 NOTE — BH INPATIENT PSYCHIATRY PROGRESS NOTE - CURRENT MEDICATION
MEDICATIONS  (STANDING):  atorvastatin 20 milliGRAM(s) Oral at bedtime  calcium carbonate 1250 mG  + Vitamin D (OsCal 500 + D) 1 Tablet(s) Oral daily  ferrous    sulfate 325 milliGRAM(s) Oral daily  levothyroxine 100 MICROGram(s) Oral daily  multivitamin/minerals 1 Tablet(s) Oral daily  OLANZapine Disintegrating Tablet 10 milliGRAM(s) Oral at bedtime  pantoprazole    Tablet 40 milliGRAM(s) Oral before breakfast  polyethylene glycol 3350 17 Gram(s) Oral daily  senna 2 Tablet(s) Oral at bedtime    MEDICATIONS  (PRN):  acetaminophen     Tablet .. 650 milliGRAM(s) Oral every 6 hours PRN Mild Pain (1 - 3), Moderate Pain (4 - 6), Severe Pain (7 - 10)  aluminum hydroxide/magnesium hydroxide/simethicone Suspension 30 milliLiter(s) Oral every 6 hours PRN Dyspepsia  artificial  tears Solution 1 Drop(s) Both EYES two times a day PRN Dry Eyes  calcium carbonate    500 mG (Tums) Chewable 1 Tablet(s) Chew every 4 hours PRN Acid reflux  haloperidol     Tablet 5 milliGRAM(s) Oral every 6 hours PRN agitation  haloperidol    Injectable 5 milliGRAM(s) IntraMuscular once PRN severe agitation  ibuprofen  Tablet. 400 milliGRAM(s) Oral every 6 hours PRN Moderate Pain (4 - 6), Severe Pain (7 - 10)  LORazepam     Tablet 1 milliGRAM(s) Oral every 6 hours PRN Agitation  LORazepam   Injectable 1 milliGRAM(s) IntraMuscular once PRN severe agitation   MEDICATIONS  (STANDING):  atorvastatin 20 milliGRAM(s) Oral at bedtime  calcium carbonate 1250 mG  + Vitamin D (OsCal 500 + D) 1 Tablet(s) Oral daily  ferrous    sulfate 325 milliGRAM(s) Oral daily  levothyroxine 100 MICROGram(s) Oral <User Schedule>  multivitamin/minerals 1 Tablet(s) Oral daily  OLANZapine Disintegrating Tablet 10 milliGRAM(s) Oral at bedtime  pantoprazole    Tablet 40 milliGRAM(s) Oral before breakfast  polyethylene glycol 3350 17 Gram(s) Oral daily  senna 2 Tablet(s) Oral at bedtime    MEDICATIONS  (PRN):  acetaminophen     Tablet .. 650 milliGRAM(s) Oral every 6 hours PRN Mild Pain (1 - 3), Moderate Pain (4 - 6), Severe Pain (7 - 10)  aluminum hydroxide/magnesium hydroxide/simethicone Suspension 30 milliLiter(s) Oral every 6 hours PRN Dyspepsia  artificial  tears Solution 1 Drop(s) Both EYES two times a day PRN Dry Eyes  calcium carbonate    500 mG (Tums) Chewable 1 Tablet(s) Chew every 4 hours PRN Acid reflux  haloperidol     Tablet 5 milliGRAM(s) Oral every 6 hours PRN agitation  haloperidol    Injectable 5 milliGRAM(s) IntraMuscular once PRN severe agitation  ibuprofen  Tablet. 400 milliGRAM(s) Oral every 6 hours PRN Moderate Pain (4 - 6), Severe Pain (7 - 10)  LORazepam     Tablet 1 milliGRAM(s) Oral every 6 hours PRN Agitation  LORazepam   Injectable 1 milliGRAM(s) IntraMuscular once PRN severe agitation  petrolatum white Ointment 1 Application(s) Topical daily PRN rash

## 2024-08-23 NOTE — BH INPATIENT PSYCHIATRY PROGRESS NOTE - ATTENDING COMMENTS
Care was discussed and reviewed in the interdisciplinary treatment team.  I, Rosalinda Jasso MD, have reviewed and verified the documentation.  I independently performed the documented medical decision making.      
Care was discussed and reviewed in the interdisciplinary treatment team.  I, Rosalinda Jasso MD, have reviewed and verified the documentation.  I independently performed the documented medical decision making.

## 2024-08-24 PROCEDURE — 99232 SBSQ HOSP IP/OBS MODERATE 35: CPT

## 2024-08-24 RX ORDER — PETROLATUM 865 MG/G
1 OINTMENT TOPICAL DAILY
Refills: 0 | Status: DISCONTINUED | OUTPATIENT
Start: 2024-08-24 | End: 2024-08-30

## 2024-08-24 RX ADMIN — POLYETHYLENE GLYCOL 3350 17 GRAM(S): 17 POWDER, FOR SOLUTION ORAL at 08:48

## 2024-08-24 RX ADMIN — OLANZAPINE 10 MILLIGRAM(S): 7.5 TABLET ORAL at 21:54

## 2024-08-24 RX ADMIN — POVIDONE, PROPYLENE GLYCOL 1 DROP(S): 6.8; 3 LIQUID OPHTHALMIC at 08:54

## 2024-08-24 RX ADMIN — Medication 1 TABLET(S): at 08:46

## 2024-08-24 RX ADMIN — Medication 40 MILLIGRAM(S): at 08:47

## 2024-08-24 RX ADMIN — Medication 325 MILLIGRAM(S): at 08:47

## 2024-08-24 RX ADMIN — Medication 400 MILLIGRAM(S): at 21:53

## 2024-08-24 RX ADMIN — Medication 20 MILLIGRAM(S): at 21:55

## 2024-08-24 RX ADMIN — Medication 400 MILLIGRAM(S): at 08:54

## 2024-08-24 RX ADMIN — Medication 2 TABLET(S): at 21:53

## 2024-08-24 RX ADMIN — Medication 100 MICROGRAM(S): at 06:53

## 2024-08-24 NOTE — BH INPATIENT PSYCHIATRY PROGRESS NOTE - NSBHMETABOLIC_PSY_ALL_CORE_FT
BMI: BMI (kg/m2): 27.7 (08-21-24 @ 13:30)  HbA1c: A1C with Estimated Average Glucose Result: 6.3 % (08-23-24 @ 10:18)    Glucose: POCT Blood Glucose.: 193 mg/dL (08-20-24 @ 18:04)    BP: --Vital Signs Last 24 Hrs  T(C): 36.7 (08-24-24 @ 07:53), Max: 36.7 (08-24-24 @ 07:53)  T(F): 98 (08-24-24 @ 07:53), Max: 98 (08-24-24 @ 07:53)  HR: --  BP: --  BP(mean): --  RR: 17 (08-24-24 @ 07:53) (17 - 17)  SpO2: --    Orthostatic VS  08-24-24 @ 07:53  Lying BP: 114/62 HR: 83  Sitting BP: --/-- HR: --  Standing BP: --/-- HR: --  Site: --  Mode: --  Orthostatic VS  08-23-24 @ 07:58  Lying BP: --/-- HR: --  Sitting BP: 156/72 HR: 114  Standing BP: 150/70 HR: 112  Site: --  Mode: --    Lipid Panel: Date/Time: 08-23-24 @ 10:18  Cholesterol, Serum: 205  LDL Cholesterol Calculated: 133  HDL Cholesterol, Serum: 46  Total Cholesterol/HDL Ration Measurement: --  Triglycerides, Serum: 130

## 2024-08-24 NOTE — BH INPATIENT PSYCHIATRY PROGRESS NOTE - NSBHCHARTREVIEWVS_PSY_A_CORE FT
Vital Signs Last 24 Hrs  T(C): 36.7 (08-24-24 @ 07:53), Max: 36.7 (08-24-24 @ 07:53)  T(F): 98 (08-24-24 @ 07:53), Max: 98 (08-24-24 @ 07:53)  HR: --  BP: --  BP(mean): --  RR: 17 (08-24-24 @ 07:53) (17 - 17)  SpO2: --    Orthostatic VS  08-24-24 @ 07:53  Lying BP: 114/62 HR: 83  Sitting BP: --/-- HR: --  Standing BP: --/-- HR: --  Site: --  Mode: --  Orthostatic VS  08-23-24 @ 07:58  Lying BP: --/-- HR: --  Sitting BP: 156/72 HR: 114  Standing BP: 150/70 HR: 112  Site: --  Mode: --

## 2024-08-24 NOTE — BH INPATIENT PSYCHIATRY PROGRESS NOTE - CURRENT MEDICATION
MEDICATIONS  (STANDING):  atorvastatin 20 milliGRAM(s) Oral at bedtime  calcium carbonate 1250 mG  + Vitamin D (OsCal 500 + D) 1 Tablet(s) Oral daily  ferrous    sulfate 325 milliGRAM(s) Oral daily  levothyroxine 100 MICROGram(s) Oral <User Schedule>  multivitamin/minerals 1 Tablet(s) Oral daily  OLANZapine Disintegrating Tablet 10 milliGRAM(s) Oral at bedtime  pantoprazole    Tablet 40 milliGRAM(s) Oral before breakfast  polyethylene glycol 3350 17 Gram(s) Oral daily  senna 2 Tablet(s) Oral at bedtime    MEDICATIONS  (PRN):  acetaminophen     Tablet .. 650 milliGRAM(s) Oral every 6 hours PRN Mild Pain (1 - 3), Moderate Pain (4 - 6), Severe Pain (7 - 10)  aluminum hydroxide/magnesium hydroxide/simethicone Suspension 30 milliLiter(s) Oral every 6 hours PRN Dyspepsia  artificial  tears Solution 1 Drop(s) Both EYES two times a day PRN Dry Eyes  calcium carbonate    500 mG (Tums) Chewable 1 Tablet(s) Chew every 4 hours PRN Acid reflux  haloperidol     Tablet 5 milliGRAM(s) Oral every 6 hours PRN agitation  haloperidol    Injectable 5 milliGRAM(s) IntraMuscular once PRN severe agitation  ibuprofen  Tablet. 400 milliGRAM(s) Oral every 6 hours PRN Moderate Pain (4 - 6), Severe Pain (7 - 10)  LORazepam     Tablet 1 milliGRAM(s) Oral every 6 hours PRN Agitation  LORazepam   Injectable 1 milliGRAM(s) IntraMuscular once PRN severe agitation  petrolatum white Ointment 1 Application(s) Topical daily PRN rash

## 2024-08-24 NOTE — BH INPATIENT PSYCHIATRY PROGRESS NOTE - PRN MEDS
MEDICATIONS  (PRN):  acetaminophen     Tablet .. 650 milliGRAM(s) Oral every 6 hours PRN Mild Pain (1 - 3), Moderate Pain (4 - 6), Severe Pain (7 - 10)  aluminum hydroxide/magnesium hydroxide/simethicone Suspension 30 milliLiter(s) Oral every 6 hours PRN Dyspepsia  artificial  tears Solution 1 Drop(s) Both EYES two times a day PRN Dry Eyes  calcium carbonate    500 mG (Tums) Chewable 1 Tablet(s) Chew every 4 hours PRN Acid reflux  haloperidol     Tablet 5 milliGRAM(s) Oral every 6 hours PRN agitation  haloperidol    Injectable 5 milliGRAM(s) IntraMuscular once PRN severe agitation  ibuprofen  Tablet. 400 milliGRAM(s) Oral every 6 hours PRN Moderate Pain (4 - 6), Severe Pain (7 - 10)  LORazepam     Tablet 1 milliGRAM(s) Oral every 6 hours PRN Agitation  LORazepam   Injectable 1 milliGRAM(s) IntraMuscular once PRN severe agitation  petrolatum white Ointment 1 Application(s) Topical daily PRN rash

## 2024-08-25 LAB
APPEARANCE UR: CLEAR — SIGNIFICANT CHANGE UP
BILIRUB UR-MCNC: NEGATIVE — SIGNIFICANT CHANGE UP
COLOR SPEC: YELLOW — SIGNIFICANT CHANGE UP
DIFF PNL FLD: NEGATIVE — SIGNIFICANT CHANGE UP
GLUCOSE UR QL: NEGATIVE MG/DL — SIGNIFICANT CHANGE UP
KETONES UR-MCNC: NEGATIVE MG/DL — SIGNIFICANT CHANGE UP
LEUKOCYTE ESTERASE UR-ACNC: NEGATIVE — SIGNIFICANT CHANGE UP
NITRITE UR-MCNC: NEGATIVE — SIGNIFICANT CHANGE UP
PH UR: 6.5 — SIGNIFICANT CHANGE UP (ref 5–8)
PROT UR-MCNC: NEGATIVE MG/DL — SIGNIFICANT CHANGE UP
SP GR SPEC: 1.01 — SIGNIFICANT CHANGE UP (ref 1–1.03)
UROBILINOGEN FLD QL: 0.2 MG/DL — SIGNIFICANT CHANGE UP (ref 0.2–1)

## 2024-08-25 PROCEDURE — 99231 SBSQ HOSP IP/OBS SF/LOW 25: CPT

## 2024-08-25 RX ADMIN — Medication 2 TABLET(S): at 20:55

## 2024-08-25 RX ADMIN — Medication 325 MILLIGRAM(S): at 08:58

## 2024-08-25 RX ADMIN — Medication 40 MILLIGRAM(S): at 08:59

## 2024-08-25 RX ADMIN — POLYETHYLENE GLYCOL 3350 17 GRAM(S): 17 POWDER, FOR SOLUTION ORAL at 08:59

## 2024-08-25 RX ADMIN — Medication 1 TABLET(S): at 08:58

## 2024-08-25 RX ADMIN — Medication 20 MILLIGRAM(S): at 20:55

## 2024-08-25 RX ADMIN — Medication 100 MICROGRAM(S): at 06:48

## 2024-08-25 RX ADMIN — OLANZAPINE 10 MILLIGRAM(S): 7.5 TABLET ORAL at 20:55

## 2024-08-25 NOTE — BH INPATIENT PSYCHIATRY PROGRESS NOTE - NSBHMETABOLIC_PSY_ALL_CORE_FT
BMI: BMI (kg/m2): 27.7 (08-21-24 @ 13:30)  HbA1c: A1C with Estimated Average Glucose Result: 6.3 % (08-23-24 @ 10:18)    Glucose: POCT Blood Glucose.: 193 mg/dL (08-20-24 @ 18:04)    BP: --Vital Signs Last 24 Hrs  T(C): 36.2 (08-25-24 @ 06:38), Max: 36.2 (08-25-24 @ 06:38)  T(F): 97.2 (08-25-24 @ 06:38), Max: 97.2 (08-25-24 @ 06:38)  HR: --  BP: --  BP(mean): --  RR: 19 (08-25-24 @ 06:38) (19 - 19)  SpO2: --    Orthostatic VS  08-25-24 @ 06:38  Lying BP: --/-- HR: --  Sitting BP: 138/79 HR: 74  Standing BP: 130/76 HR: 80  Site: --  Mode: --  Orthostatic VS  08-24-24 @ 07:53  Lying BP: 114/62 HR: 83  Sitting BP: --/-- HR: --  Standing BP: --/-- HR: --  Site: --  Mode: --    Lipid Panel: Date/Time: 08-23-24 @ 10:18  Cholesterol, Serum: 205  LDL Cholesterol Calculated: 133  HDL Cholesterol, Serum: 46  Total Cholesterol/HDL Ration Measurement: --  Triglycerides, Serum: 130

## 2024-08-25 NOTE — BH INPATIENT PSYCHIATRY PROGRESS NOTE - NSBHMSETHTCONTENT_PSY_A_CORE
Delusions/Ideas of reference/Preoccupations

## 2024-08-25 NOTE — BH INPATIENT PSYCHIATRY PROGRESS NOTE - NSBHMSEGAIT_PSY_A_CORE
ambulates with cane and assistance/Abnormal gait / station

## 2024-08-25 NOTE — BH INPATIENT PSYCHIATRY PROGRESS NOTE - NSBHCHARTREVIEWVS_PSY_A_CORE FT
Vital Signs Last 24 Hrs  T(C): 36.2 (08-25-24 @ 06:38), Max: 36.2 (08-25-24 @ 06:38)  T(F): 97.2 (08-25-24 @ 06:38), Max: 97.2 (08-25-24 @ 06:38)  HR: --  BP: --  BP(mean): --  RR: 19 (08-25-24 @ 06:38) (19 - 19)  SpO2: --    Orthostatic VS  08-25-24 @ 06:38  Lying BP: --/-- HR: --  Sitting BP: 138/79 HR: 74  Standing BP: 130/76 HR: 80  Site: --  Mode: --  Orthostatic VS  08-24-24 @ 07:53  Lying BP: 114/62 HR: 83  Sitting BP: --/-- HR: --  Standing BP: --/-- HR: --  Site: --  Mode: --

## 2024-08-25 NOTE — BH INPATIENT PSYCHIATRY PROGRESS NOTE - NSBHPSYCHOLCOGABN_PSY_A_CORE
disoriented to time/disoriented to place

## 2024-08-26 PROCEDURE — 70553 MRI BRAIN STEM W/O & W/DYE: CPT | Mod: 26

## 2024-08-26 PROCEDURE — 99232 SBSQ HOSP IP/OBS MODERATE 35: CPT

## 2024-08-26 RX ADMIN — Medication 40 MILLIGRAM(S): at 07:03

## 2024-08-26 RX ADMIN — Medication 1 TABLET(S): at 08:52

## 2024-08-26 RX ADMIN — Medication 325 MILLIGRAM(S): at 08:52

## 2024-08-26 RX ADMIN — Medication 20 MILLIGRAM(S): at 20:50

## 2024-08-26 RX ADMIN — Medication 2 TABLET(S): at 20:50

## 2024-08-26 RX ADMIN — POLYETHYLENE GLYCOL 3350 17 GRAM(S): 17 POWDER, FOR SOLUTION ORAL at 08:52

## 2024-08-26 RX ADMIN — Medication 100 MICROGRAM(S): at 06:00

## 2024-08-26 RX ADMIN — OLANZAPINE 10 MILLIGRAM(S): 7.5 TABLET ORAL at 20:50

## 2024-08-26 NOTE — BH PSYCHOLOGY - GROUP THERAPY NOTE - TOKEN PULL-DIAGNOSIS
Primary Diagnosis:  Psychosis [F29]        Problem Dx:   H/O poliomyelitis [Z86.12]      Psychosis, unspecified psychosis type [F29]

## 2024-08-26 NOTE — BH PSYCHOLOGY - GROUP THERAPY NOTE - NSPSYCHOLGRPDBTGOAL_PSY_A_CORE
reduce mood and affective lability/reduce impulsive self-defeating behavior/reduce interpersonal conflicts/improve ability to indentify feelings/improve ability to communicate feelings/reduce vulnerability to emotional dysregualation/promote skills to reduce anger

## 2024-08-26 NOTE — BH INPATIENT PSYCHIATRY PROGRESS NOTE - NSBHCHARTREVIEWVS_PSY_A_CORE FT
Vital Signs Last 24 Hrs  T(C): 36.7 (08-26-24 @ 09:18), Max: 36.7 (08-26-24 @ 09:18)  T(F): 98 (08-26-24 @ 09:18), Max: 98 (08-26-24 @ 09:18)  HR: --  BP: --  BP(mean): --  RR: --  SpO2: --    Orthostatic VS  08-26-24 @ 09:18  Lying BP: --/-- HR: --  Sitting BP: 142/88 HR: 108  Standing BP: 140/90 HR: 119  Site: --  Mode: --  Orthostatic VS  08-25-24 @ 06:38  Lying BP: --/-- HR: --  Sitting BP: 138/79 HR: 74  Standing BP: 130/76 HR: 80  Site: --  Mode: --

## 2024-08-26 NOTE — BH INPATIENT PSYCHIATRY PROGRESS NOTE - NSBHMETABOLIC_PSY_ALL_CORE_FT
BMI: BMI (kg/m2): 27.7 (08-21-24 @ 13:30)  HbA1c: A1C with Estimated Average Glucose Result: 6.3 % (08-23-24 @ 10:18)    Glucose: POCT Blood Glucose.: 193 mg/dL (08-20-24 @ 18:04)    BP: --Vital Signs Last 24 Hrs  T(C): 36.7 (08-26-24 @ 09:18), Max: 36.7 (08-26-24 @ 09:18)  T(F): 98 (08-26-24 @ 09:18), Max: 98 (08-26-24 @ 09:18)  HR: --  BP: --  BP(mean): --  RR: --  SpO2: --    Orthostatic VS  08-26-24 @ 09:18  Lying BP: --/-- HR: --  Sitting BP: 142/88 HR: 108  Standing BP: 140/90 HR: 119  Site: --  Mode: --  Orthostatic VS  08-25-24 @ 06:38  Lying BP: --/-- HR: --  Sitting BP: 138/79 HR: 74  Standing BP: 130/76 HR: 80  Site: --  Mode: --    Lipid Panel: Date/Time: 08-23-24 @ 10:18  Cholesterol, Serum: 205  LDL Cholesterol Calculated: 133  HDL Cholesterol, Serum: 46  Total Cholesterol/HDL Ration Measurement: --  Triglycerides, Serum: 130

## 2024-08-26 NOTE — BH PSYCHOLOGY - GROUP THERAPY NOTE - NSBHPSYCHOLRESPCOMMENT_PSY_A_CORE FT
The patient appeared adequately groomed and casually dressed. Pt was not engaged in the group as evidenced by not participating in group discussion. Pt did not volunteer to read from the worksheet when given the opportunity. While patient was not engaged, she was appropriate with her peers. Pt stayed for whole group.

## 2024-08-26 NOTE — BH PSYCHOLOGY - GROUP THERAPY NOTE - NSPSYCHOLGRPDBTPT_PSY_A_CORE FT
Patient attended a Dialectal Behavior Therapy Group incorporating DBT- based emotion regulation concepts. The group started with a brief check-in asking pts to practice in a mindful exercise to help pt’s become present and focused. The group was provided with an overview of "opposite action” as a DBT skill. This included the concept of recognizing an emotion, evaluating whether it fits the facts or is not effective and then taking actions opposite to the emotion to reduce distress. Pts were encouraged to share their personal experiences with using or struggling to use opposite action in their life. Pt’s discussed challenges and successes. Pt were given a homework assignment to practice opposite action with a certain emotion which they were given handouts for, such a sadness, anger, fear, guilt shame etc. Group facilitator explained concepts, reinforced participation, and engaged patients in the discussion.

## 2024-08-27 LAB
CULTURE RESULTS: SIGNIFICANT CHANGE UP
SPECIMEN SOURCE: SIGNIFICANT CHANGE UP

## 2024-08-27 PROCEDURE — 99232 SBSQ HOSP IP/OBS MODERATE 35: CPT

## 2024-08-27 RX ADMIN — Medication 1 TABLET(S): at 08:15

## 2024-08-27 RX ADMIN — Medication 100 MICROGRAM(S): at 05:36

## 2024-08-27 RX ADMIN — Medication 325 MILLIGRAM(S): at 08:15

## 2024-08-27 RX ADMIN — POLYETHYLENE GLYCOL 3350 17 GRAM(S): 17 POWDER, FOR SOLUTION ORAL at 08:17

## 2024-08-27 RX ADMIN — Medication 1 TABLET(S): at 08:16

## 2024-08-27 RX ADMIN — Medication 20 MILLIGRAM(S): at 20:58

## 2024-08-27 RX ADMIN — OLANZAPINE 10 MILLIGRAM(S): 7.5 TABLET ORAL at 20:58

## 2024-08-27 RX ADMIN — Medication 2 TABLET(S): at 20:58

## 2024-08-27 RX ADMIN — Medication 400 MILLIGRAM(S): at 08:16

## 2024-08-27 RX ADMIN — Medication 40 MILLIGRAM(S): at 08:15

## 2024-08-27 NOTE — BH INPATIENT PSYCHIATRY PROGRESS NOTE - NSBHMETABOLIC_PSY_ALL_CORE_FT
BMI: BMI (kg/m2): 27.7 (08-21-24 @ 13:30)  HbA1c: A1C with Estimated Average Glucose Result: 6.3 % (08-23-24 @ 10:18)    Glucose: POCT Blood Glucose.: 193 mg/dL (08-20-24 @ 18:04)    BP: --Vital Signs Last 24 Hrs  T(C): 35.8 (08-27-24 @ 08:34), Max: 35.8 (08-27-24 @ 08:34)  T(F): 96.4 (08-27-24 @ 08:34), Max: 96.4 (08-27-24 @ 08:34)  HR: --  BP: --  BP(mean): --  RR: --  SpO2: --    Orthostatic VS  08-27-24 @ 08:34  Lying BP: --/-- HR: --  Sitting BP: 135/75 HR: 91  Standing BP: 123/84 HR: 107  Site: --  Mode: --  Orthostatic VS  08-26-24 @ 09:18  Lying BP: --/-- HR: --  Sitting BP: 142/88 HR: 108  Standing BP: 140/90 HR: 119  Site: --  Mode: --    Lipid Panel: Date/Time: 08-23-24 @ 10:18  Cholesterol, Serum: 205  LDL Cholesterol Calculated: 133  HDL Cholesterol, Serum: 46  Total Cholesterol/HDL Ration Measurement: --  Triglycerides, Serum: 130

## 2024-08-27 NOTE — BH INPATIENT PSYCHIATRY PROGRESS NOTE - NSBHCHARTREVIEWVS_PSY_A_CORE FT
Vital Signs Last 24 Hrs  T(C): 35.8 (08-27-24 @ 08:34), Max: 35.8 (08-27-24 @ 08:34)  T(F): 96.4 (08-27-24 @ 08:34), Max: 96.4 (08-27-24 @ 08:34)  HR: --  BP: --  BP(mean): --  RR: --  SpO2: --    Orthostatic VS  08-27-24 @ 08:34  Lying BP: --/-- HR: --  Sitting BP: 135/75 HR: 91  Standing BP: 123/84 HR: 107  Site: --  Mode: --  Orthostatic VS  08-26-24 @ 09:18  Lying BP: --/-- HR: --  Sitting BP: 142/88 HR: 108  Standing BP: 140/90 HR: 119  Site: --  Mode: --

## 2024-08-27 NOTE — PHARMACOTHERAPY INTERVENTION NOTE - COMMENTS
Concurrent use of LEVOTHYROXINE, ANTACIDS (e.g., magnesium and calcium), and SIMETHICONE may result in decreased levothyroxine effectiveness.    Added administration instruction to PRN order for calcium carbonate to "Do not administer within 4 hours of levothyroxine."    Samantah Franklin, BonD, BCPP

## 2024-08-28 PROCEDURE — 99231 SBSQ HOSP IP/OBS SF/LOW 25: CPT

## 2024-08-28 RX ORDER — PSYLLIUM HUSK 0.4 G
1 CAPSULE ORAL AT BEDTIME
Refills: 0 | Status: DISCONTINUED | OUTPATIENT
Start: 2024-08-29 | End: 2024-08-30

## 2024-08-28 RX ORDER — LORAZEPAM 4 MG/ML
1 INJECTION INTRAMUSCULAR; INTRAVENOUS EVERY 6 HOURS
Refills: 0 | Status: DISCONTINUED | OUTPATIENT
Start: 2024-08-28 | End: 2024-08-30

## 2024-08-28 RX ORDER — LORAZEPAM 4 MG/ML
1 INJECTION INTRAMUSCULAR; INTRAVENOUS ONCE
Refills: 0 | Status: DISCONTINUED | OUTPATIENT
Start: 2024-08-28 | End: 2024-08-30

## 2024-08-28 RX ORDER — FERROUS SULFATE 325(65) MG
325 TABLET ORAL AT BEDTIME
Refills: 0 | Status: DISCONTINUED | OUTPATIENT
Start: 2024-08-29 | End: 2024-08-30

## 2024-08-28 RX ORDER — CALCIUM CARBONATE/VITAMIN D3 500MG-5MCG
1 TABLET ORAL AT BEDTIME
Refills: 0 | Status: DISCONTINUED | OUTPATIENT
Start: 2024-08-29 | End: 2024-08-30

## 2024-08-28 RX ADMIN — Medication 100 MICROGRAM(S): at 06:24

## 2024-08-28 RX ADMIN — ACETAMINOPHEN 650 MILLIGRAM(S): 325 TABLET ORAL at 20:49

## 2024-08-28 RX ADMIN — Medication 40 MILLIGRAM(S): at 09:48

## 2024-08-28 RX ADMIN — Medication 1 TABLET(S): at 09:47

## 2024-08-28 RX ADMIN — Medication 325 MILLIGRAM(S): at 09:48

## 2024-08-28 RX ADMIN — OLANZAPINE 10 MILLIGRAM(S): 7.5 TABLET ORAL at 20:49

## 2024-08-28 RX ADMIN — Medication 1 TABLET(S): at 09:48

## 2024-08-28 RX ADMIN — Medication 2 TABLET(S): at 20:49

## 2024-08-28 RX ADMIN — ACETAMINOPHEN 650 MILLIGRAM(S): 325 TABLET ORAL at 09:47

## 2024-08-28 RX ADMIN — Medication 1 TABLET(S): at 20:49

## 2024-08-28 RX ADMIN — Medication 20 MILLIGRAM(S): at 20:49

## 2024-08-28 RX ADMIN — ACETAMINOPHEN 650 MILLIGRAM(S): 325 TABLET ORAL at 21:47

## 2024-08-28 NOTE — BH INPATIENT PSYCHIATRY PROGRESS NOTE - NSBHMETABOLIC_PSY_ALL_CORE_FT
BMI: BMI (kg/m2): 27.7 (08-21-24 @ 13:30)  HbA1c: A1C with Estimated Average Glucose Result: 6.3 % (08-23-24 @ 10:18)    Glucose: POCT Blood Glucose.: 193 mg/dL (08-20-24 @ 18:04)    BP: --Vital Signs Last 24 Hrs  T(C): 36.8 (08-28-24 @ 07:29), Max: 36.8 (08-28-24 @ 07:29)  T(F): 98.2 (08-28-24 @ 07:29), Max: 98.2 (08-28-24 @ 07:29)  HR: --  BP: --  BP(mean): --  RR: 18 (08-28-24 @ 07:29) (18 - 18)  SpO2: --    Orthostatic VS  08-28-24 @ 07:29  Lying BP: --/-- HR: --  Sitting BP: 124/90 HR: 93  Standing BP: 134/87 HR: 103  Site: --  Mode: --  Orthostatic VS  08-27-24 @ 08:34  Lying BP: --/-- HR: --  Sitting BP: 135/75 HR: 91  Standing BP: 123/84 HR: 107  Site: --  Mode: --    Lipid Panel: Date/Time: 08-23-24 @ 10:18  Cholesterol, Serum: 205  LDL Cholesterol Calculated: 133  HDL Cholesterol, Serum: 46  Total Cholesterol/HDL Ration Measurement: --  Triglycerides, Serum: 130

## 2024-08-28 NOTE — BH INPATIENT PSYCHIATRY PROGRESS NOTE - NSBHCHARTREVIEWVS_PSY_A_CORE FT
Vital Signs Last 24 Hrs  T(C): 36.8 (08-28-24 @ 07:29), Max: 36.8 (08-28-24 @ 07:29)  T(F): 98.2 (08-28-24 @ 07:29), Max: 98.2 (08-28-24 @ 07:29)  HR: --  BP: --  BP(mean): --  RR: 18 (08-28-24 @ 07:29) (18 - 18)  SpO2: --    Orthostatic VS  08-28-24 @ 07:29  Lying BP: --/-- HR: --  Sitting BP: 124/90 HR: 93  Standing BP: 134/87 HR: 103  Site: --  Mode: --  Orthostatic VS  08-27-24 @ 08:34  Lying BP: --/-- HR: --  Sitting BP: 135/75 HR: 91  Standing BP: 123/84 HR: 107  Site: --  Mode: --

## 2024-08-29 PROCEDURE — 99231 SBSQ HOSP IP/OBS SF/LOW 25: CPT

## 2024-08-29 RX ORDER — FERROUS SULFATE 325(65) MG
1 TABLET ORAL
Qty: 0 | Refills: 0 | DISCHARGE
Start: 2024-08-29

## 2024-08-29 RX ORDER — SENNA 187 MG
2 TABLET ORAL
Qty: 0 | Refills: 0 | DISCHARGE
Start: 2024-08-29

## 2024-08-29 RX ORDER — LEVOTHYROXINE SODIUM 100 MCG
1 TABLET ORAL
Qty: 30 | Refills: 0
Start: 2024-08-29 | End: 2024-09-27

## 2024-08-29 RX ORDER — POLYETHYLENE GLYCOL 3350 17 G/17G
17 POWDER, FOR SOLUTION ORAL
Qty: 0 | Refills: 0 | DISCHARGE
Start: 2024-08-29

## 2024-08-29 RX ORDER — PSYLLIUM HUSK 0.4 G
1 CAPSULE ORAL
Qty: 0 | Refills: 0 | DISCHARGE
Start: 2024-08-29

## 2024-08-29 RX ORDER — OLANZAPINE 7.5 MG/1
1 TABLET ORAL
Qty: 30 | Refills: 0
Start: 2024-08-29 | End: 2024-09-27

## 2024-08-29 RX ORDER — PANTOPRAZOLE SODIUM 40 MG
1 TABLET, DELAYED RELEASE (ENTERIC COATED) ORAL
Qty: 30 | Refills: 0
Start: 2024-08-29 | End: 2024-09-27

## 2024-08-29 RX ORDER — CALCIUM CARBONATE/VITAMIN D3 500MG-5MCG
1 TABLET ORAL
Qty: 0 | Refills: 0 | DISCHARGE
Start: 2024-08-29

## 2024-08-29 RX ADMIN — ACETAMINOPHEN 650 MILLIGRAM(S): 325 TABLET ORAL at 22:50

## 2024-08-29 RX ADMIN — Medication 325 MILLIGRAM(S): at 21:37

## 2024-08-29 RX ADMIN — Medication 1 TABLET(S): at 21:37

## 2024-08-29 RX ADMIN — Medication 2 TABLET(S): at 21:38

## 2024-08-29 RX ADMIN — Medication 1 TABLET(S): at 21:38

## 2024-08-29 RX ADMIN — Medication 20 MILLIGRAM(S): at 21:37

## 2024-08-29 RX ADMIN — OLANZAPINE 10 MILLIGRAM(S): 7.5 TABLET ORAL at 21:38

## 2024-08-29 RX ADMIN — Medication 100 MICROGRAM(S): at 06:27

## 2024-08-29 RX ADMIN — Medication 40 MILLIGRAM(S): at 08:30

## 2024-08-29 RX ADMIN — ACETAMINOPHEN 650 MILLIGRAM(S): 325 TABLET ORAL at 23:50

## 2024-08-29 RX ADMIN — POLYETHYLENE GLYCOL 3350 17 GRAM(S): 17 POWDER, FOR SOLUTION ORAL at 08:30

## 2024-08-29 RX ADMIN — POVIDONE, PROPYLENE GLYCOL 1 DROP(S): 6.8; 3 LIQUID OPHTHALMIC at 10:16

## 2024-08-29 NOTE — BH INPATIENT PSYCHIATRY PROGRESS NOTE - NSTXDCOPLKDATETRGT_PSY_ALL_CORE
30-Aug-2024
Statement Selected
30-Aug-2024

## 2024-08-29 NOTE — BH DISCHARGE NOTE NURSING/SOCIAL WORK/PSYCH REHAB - PATIENT PORTAL LINK FT
You can access the FollowMyHealth Patient Portal offered by [facility] by registering at the following website: [email or web address]. By joining [employer]’s FollowMyHealth portal, you will also be able to view your health information using other applications (apps) compatible with our system.

## 2024-08-29 NOTE — BH INPATIENT PSYCHIATRY PROGRESS NOTE - NSBHMETABOLIC_PSY_ALL_CORE_FT
BMI: BMI (kg/m2): 27.7 (08-21-24 @ 13:30)  HbA1c: A1C with Estimated Average Glucose Result: 6.3 % (08-23-24 @ 10:18)    Glucose: POCT Blood Glucose.: 193 mg/dL (08-20-24 @ 18:04)    BP: --Vital Signs Last 24 Hrs  T(C): 36.5 (08-29-24 @ 07:48), Max: 36.5 (08-29-24 @ 07:48)  T(F): 97.7 (08-29-24 @ 07:48), Max: 97.7 (08-29-24 @ 07:48)  HR: --  BP: --  BP(mean): --  RR: 17 (08-29-24 @ 07:48) (17 - 17)  SpO2: --    Orthostatic VS  08-29-24 @ 07:48  Lying BP: --/-- HR: --  Sitting BP: 139/76 HR: 85  Standing BP: 143/79 HR: 93  Site: --  Mode: --  Orthostatic VS  08-28-24 @ 07:29  Lying BP: --/-- HR: --  Sitting BP: 124/90 HR: 93  Standing BP: 134/87 HR: 103  Site: --  Mode: --    Lipid Panel: Date/Time: 08-23-24 @ 10:18  Cholesterol, Serum: 205  LDL Cholesterol Calculated: 133  HDL Cholesterol, Serum: 46  Total Cholesterol/HDL Ration Measurement: --  Triglycerides, Serum: 130

## 2024-08-29 NOTE — BH DISCHARGE NOTE NURSING/SOCIAL WORK/PSYCH REHAB - NSDCPRRECOMMEND_PSY_ALL_CORE
Ted Rodriguez is a 34 year old female presenting with  Possible sinus infection x 3 weeks. Pt c/o having cough and congestion with yellow mucous,pain in face, nausea x 1 week \" vomited once\", and notice white bumps on upper arm. Pt has taken tylenol and ibuprofen for symptoms.   Denies known Latex allergy or symptoms of Latex sensitivity.  Medications verified, no changes.  Pharmacy verified  History   Smoking Status   • Former Smoker   • Packs/day: 0.25   • Years: 3.00   • Types: Cigarettes   • Quit date: 2/1/2007   Smokeless Tobacco   • Never Used     Health Maintenance Summary     Topic Due On Due Status Completed On    Pap Smear - Cervical Cancer Screening  Apr 11, 2023 Not Due Apr 11, 2018    Immunization - TDAP Pregnancy  Hidden     IMMUNIZATION - DTaP/Tdap/Td Apr 6, 2026 Not Due Apr 6, 2016    Immunization-Influenza  Completed Nov 22, 2017    Depression Screening Nov 22, 2018 Not Due Nov 22, 2017          Patient is up to date, no discussion needed .         Upon discharge, it is recommended that patient continue to attend a structured and supportive intervention to sustain noted improvements.

## 2024-08-29 NOTE — BH INPATIENT PSYCHIATRY PROGRESS NOTE - NSTXFALLINTERMD_PSY_ALL_CORE
Psychoeducation

## 2024-08-29 NOTE — BH INPATIENT PSYCHIATRY PROGRESS NOTE - NSBHATTESTBILLING_PSY_A_CORE
56069-Dopfgdknwp OBS or IP - moderate complexity OR 35-49 mins
87588-Fgensnydpt OBS or IP - moderate complexity OR 35-49 mins
87332-Nhwuplvbyw OBS or IP - moderate complexity OR 35-49 mins
45852-Kostqdnpqh OBS or IP - moderate complexity OR 35-49 mins
34233-Qtzpyiptke OBS or IP - low complexity OR 25-34 mins
70029-Oaeghdamwz OBS or IP - low complexity OR 25-34 mins
40992-Xnmvbrtnpx OBS or IP - low complexity OR 25-34 mins
30755-Extqxtgwhb OBS or IP - moderate complexity OR 35-49 mins

## 2024-08-29 NOTE — BH DISCHARGE NOTE NURSING/SOCIAL WORK/PSYCH REHAB - NSCDUDCCRISIS_PSY_A_CORE
[facility]  1 [phone]) [facility] ([phone])  Text "WELL" to [phone]  Website: [email or web address]/.[employer] 1 [phone] 621-SEQL (7563) Website: www.safehorizon.org/.National Suicide Prevention Lifeline 2 (761) 878-0105/.  [employer]  1 (131) LIF[employer] (414-9767)/.  [facility]  [phone] [place] [address] [phone]   Hours:  Monday through Friday from 9 AM to 3 PM/988 Suicide and Crisis Lifeline

## 2024-08-29 NOTE — BH INPATIENT PSYCHIATRY PROGRESS NOTE - NSTXDISORGGOAL_PSY_ALL_CORE
Will demonstrate purposeful and predictable thoughts/behaviors by making a request

## 2024-08-29 NOTE — BH INPATIENT PSYCHIATRY PROGRESS NOTE - NSBHFUPINTERVALCCFT_PSY_A_CORE
F/u for psychosis
Psychosis
"I am in shower" 
F/u for psychosis
F/u for psychosis

## 2024-08-29 NOTE — BH DISCHARGE NOTE NURSING/SOCIAL WORK/PSYCH REHAB - DISCHARGE INSTRUCTIONS AFTERCARE APPOINTMENTS
In order to check the location, date, or time of your aftercare appointment, please refer to your Discharge Instructions Document given to you upon leaving the hospital.  If you have lost the instructions please call [phone]

## 2024-08-29 NOTE — BH INPATIENT PSYCHIATRY PROGRESS NOTE - NSTXPSYCHOGOAL_PSY_ALL_CORE
Will identify 2 coping skills that help mitigate hallucinations
Will be able to report experiencing hallucinations to staff

## 2024-08-29 NOTE — BH INPATIENT PSYCHIATRY PROGRESS NOTE - NSTXDISORGINTERMD_PSY_ALL_CORE
Med management; group and milieu therapy 

## 2024-08-29 NOTE — BH INPATIENT PSYCHIATRY PROGRESS NOTE - NSBHCONSBHPROVCNTCTNOFT_PSY_A_CORE
Will review CVM

## 2024-08-29 NOTE — BH INPATIENT PSYCHIATRY PROGRESS NOTE - NSICDXBHSECONDARYDX_PSY_ALL_CORE
H/O poliomyelitis   Z86.12  

## 2024-08-29 NOTE — BH DISCHARGE NOTE NURSING/SOCIAL WORK/PSYCH REHAB - NSDCPRGOAL_PSY_ALL_CORE
The patient met her specified goal to identify 2 coping skills that help mitigate hallucinations for her psychotic symptoms goal. Progress was evidenced by an improved ability to tolerate some of the groups in and engaging in appropriate and supportive dialogue with peers during communal engagement. The patient attended 13% of the Psychiatric Rehabilitation groups throughout her stay. The writer completed the patient’s safety plan during this session.

## 2024-08-29 NOTE — BH INPATIENT PSYCHIATRY PROGRESS NOTE - NSBHASSESSSUMMFT_PSY_ALL_CORE
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 ED brought in by family for worsening paranoia, hallucinations and agitation at home.     The patient has been doing well on the unit.  She denies any psychosis or mood symptoms, and behavior has been well controlled.  MRI WNL.  Continue 1:1 for support as patient is unsteady.  Attempting to increase support at home prior to discharge.    Plan: Admit to 2West, 2PC  -Admit to 2west   -Continue CO 1:1 for fall risk   -Psychiatry:   OLANZapine Disintegrating Tablet 10 milliGRAM(s) Oral at bedtime  haloperidol     Tablet 5 milliGRAM(s) Oral every 6 hours PRN agitation  haloperidol    Injectable 5 milliGRAM(s) IntraMuscular once PRN severe agitation  LORazepam     Tablet 1 milliGRAM(s) Oral every 6 hours PRN Agitation  LORazepam   Injectable 1 milliGRAM(s) IntraMuscular once PRN severe agitation  -Medical:  atorvastatin 20 milliGRAM(s) Oral at bedtime  calcium carbonate 1250 mG  + Vitamin D (OsCal 500 + D) 1 Tablet(s) Oral daily  ferrous    sulfate 325 milliGRAM(s) Oral daily  multivitamin/minerals 1 Tablet(s) Oral daily  pantoprazole    Tablet 40 milliGRAM(s) Oral before breakfast  senna 2 Tablet(s) Oral at bedtime  acetaminophen     Tablet .. 650 milliGRAM(s) Oral every 6 hours PRN Mild Pain (1 - 3), Moderate Pain (4 - 6), Severe Pain (7 - 10)  aluminum hydroxide/magnesium hydroxide/simethicone Suspension 30 milliLiter(s) Oral every 6 hours PRN Dyspepsia  -Group and milieu therapy  -Dispo: Patient to return home - attempting to maximize help at home prior to discharge.  
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12), HLD, hypothyroidism, chronic pain; PPH of schizophrenia (reportedly received this diagnosis in [place] when she was young), MDD, no prior hospitalizations, seen in [facility] for depressed mood in 10/23, referred to [facility], not currently in psychiatric treatment, taking seroquel 50mg qhs prescribed by PCP for insomnia, prior med trial of zoloft 25mg, no known history of SA/NSSIB, no legal history, no substance use, presented to [facility] ED brought in by family for worsening paranoia, hallucinations and agitation at home.     The patient has been doing well on the unit.  She denies any psychosis or mood symptoms, and behavior has been well controlled.  MRI WNL.  Continue 1:1 for support as patient is unsteady.    Plan: Admit to 2West, 2PC  -Admit to 2west   -Continue CO 1:1 for fall risk   -Psychiatry:   OLANZapine Disintegrating Tablet 10 milliGRAM(s) Oral at bedtime  haloperidol     Tablet 5 milliGRAM(s) Oral every 6 hours PRN agitation  haloperidol    Injectable 5 milliGRAM(s) IntraMuscular once PRN severe agitation  LORazepam     Tablet 1 milliGRAM(s) Oral every 6 hours PRN Agitation  LORazepam   Injectable 1 milliGRAM(s) IntraMuscular once PRN severe agitation  -Medical:  atorvastatin 20 milliGRAM(s) Oral at bedtime  calcium carbonate 1250 mG  + Vitamin D (OsCal 500 + D) 1 Tablet(s) Oral daily  ferrous    sulfate 325 milliGRAM(s) Oral daily  multivitamin/minerals 1 Tablet(s) Oral daily  pantoprazole    Tablet 40 milliGRAM(s) Oral before breakfast  senna 2 Tablet(s) Oral at bedtime  acetaminophen     Tablet .. 650 milliGRAM(s) Oral every 6 hours PRN Mild Pain (1 - 3), Moderate Pain (4 - 6), Severe Pain (7 - 10)  aluminum hydroxide/magnesium hydroxide/simethicone Suspension 30 milliLiter(s) Oral every 6 hours PRN Dyspepsia  -Group and milieu therapy  -Dispo: Patient to return home - attempting to maximize help at home prior to discharge.  
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12), HLD, hypothyroidism, chronic pain; PPH of schizophrenia (reportedly received this diagnosis in [place] when she was young), MDD, no prior hospitalizations, seen in [facility] for depressed mood in 10/23, referred to [facility], not currently in psychiatric treatment, taking seroquel 50mg qhs prescribed by PCP for insomnia, prior med trial of zoloft 25mg, no known history of SA/NSSIB, no legal history, no substance use, presented to [facility] ED brought in by family for worsening paranoia, hallucinations and agitation at home.     The patient has been showing improvement in symptoms on the unit.  She denies any psychosis and behavior has been well controlled.  MRI WNL.  Continue 1:1 for support as patient is unsteady.    Plan: Admit to 2West, 2PC  -Admit to 2west   -Continue CO 1:1 for fall risk   -Psychiatry:   OLANZapine Disintegrating Tablet 10 milliGRAM(s) Oral at bedtime  haloperidol     Tablet 5 milliGRAM(s) Oral every 6 hours PRN agitation  haloperidol    Injectable 5 milliGRAM(s) IntraMuscular once PRN severe agitation  LORazepam     Tablet 1 milliGRAM(s) Oral every 6 hours PRN Agitation  LORazepam   Injectable 1 milliGRAM(s) IntraMuscular once PRN severe agitation  -Medical:  atorvastatin 20 milliGRAM(s) Oral at bedtime  calcium carbonate 1250 mG  + Vitamin D (OsCal 500 + D) 1 Tablet(s) Oral daily  ferrous    sulfate 325 milliGRAM(s) Oral daily  multivitamin/minerals 1 Tablet(s) Oral daily  pantoprazole    Tablet 40 milliGRAM(s) Oral before breakfast  senna 2 Tablet(s) Oral at bedtime  acetaminophen     Tablet .. 650 milliGRAM(s) Oral every 6 hours PRN Mild Pain (1 - 3), Moderate Pain (4 - 6), Severe Pain (7 - 10)  aluminum hydroxide/magnesium hydroxide/simethicone Suspension 30 milliLiter(s) Oral every 6 hours PRN Dyspepsia  -Group and milieu therapy  -Dispo: Patient to return home - attempting to maximize help at home prior to discharge.  
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12), HLD, hypothyroidism, chronic pain; PPH of schizophrenia (reportedly received this diagnosis in [place] when she was young), MDD, no prior hospitalizations, seen in [facility] for depressed mood in 10/23, referred to [facility], not currently in psychiatric treatment, taking seroquel 50mg qhs prescribed by PCP for insomnia, prior med trial of zoloft 25mg, no known history of SA/NSSIB, no legal history, no substance use, presented to [facility] ED brought in by family for worsening paranoia, hallucinations and agitation at home.       On assessment today, patient was pleasant and cooperative, denied AVH, SIIP.     Plan: Admit to 2West, 2PC  -Admit to 2west   -No CO needed, routine observation, q15 checks   -Psychiatry:   OLANZapine Disintegrating Tablet 5 milliGRAM(s) Oral at bedtime  haloperidol     Tablet 5 milliGRAM(s) Oral every 6 hours PRN agitation  haloperidol    Injectable 5 milliGRAM(s) IntraMuscular once PRN severe agitation  LORazepam     Tablet 1 milliGRAM(s) Oral every 6 hours PRN Agitation  LORazepam   Injectable 1 milliGRAM(s) IntraMuscular once PRN severe agitation  -Medical:  atorvastatin 20 milliGRAM(s) Oral at bedtime  calcium carbonate 1250 mG  + Vitamin D (OsCal 500 + D) 1 Tablet(s) Oral daily  ferrous    sulfate 325 milliGRAM(s) Oral daily  multivitamin/minerals 1 Tablet(s) Oral daily  pantoprazole    Tablet 40 milliGRAM(s) Oral before breakfast  senna 2 Tablet(s) Oral at bedtime  acetaminophen     Tablet .. 650 milliGRAM(s) Oral every 6 hours PRN Mild Pain (1 - 3), Moderate Pain (4 - 6), Severe Pain (7 - 10)  aluminum hydroxide/magnesium hydroxide/simethicone Suspension 30 milliLiter(s) Oral every 6 hours PRN Dyspepsia  -Group and milieu therapy  -Dispo as per SW  
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     The patient has been showing improvement in symptoms on the unit.  She denies any psychosis and behavior has been well controlled.  MRI today.  Continue 1:1 for support as patient is unsteady.    Plan: Admit to 2West, 2PC  -Admit to 2west   -Continue CO 1:1 for fall risk   -Psychiatry:   OLANZapine Disintegrating Tablet 10 milliGRAM(s) Oral at bedtime  haloperidol     Tablet 5 milliGRAM(s) Oral every 6 hours PRN agitation  haloperidol    Injectable 5 milliGRAM(s) IntraMuscular once PRN severe agitation  LORazepam     Tablet 1 milliGRAM(s) Oral every 6 hours PRN Agitation  LORazepam   Injectable 1 milliGRAM(s) IntraMuscular once PRN severe agitation  -Medical:  atorvastatin 20 milliGRAM(s) Oral at bedtime  calcium carbonate 1250 mG  + Vitamin D (OsCal 500 + D) 1 Tablet(s) Oral daily  ferrous    sulfate 325 milliGRAM(s) Oral daily  multivitamin/minerals 1 Tablet(s) Oral daily  pantoprazole    Tablet 40 milliGRAM(s) Oral before breakfast  senna 2 Tablet(s) Oral at bedtime  acetaminophen     Tablet .. 650 milliGRAM(s) Oral every 6 hours PRN Mild Pain (1 - 3), Moderate Pain (4 - 6), Severe Pain (7 - 10)  aluminum hydroxide/magnesium hydroxide/simethicone Suspension 30 milliLiter(s) Oral every 6 hours PRN Dyspepsia  -Group and milieu therapy  -Dispo as per SW  
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12), HLD, hypothyroidism, chronic pain; PPH of schizophrenia (reportedly received this diagnosis in [place] when she was young), MDD, no prior hospitalizations, seen in [facility] for depressed mood in 10/23, referred to [facility], not currently in psychiatric treatment, taking seroquel 50mg qhs prescribed by PCP for insomnia, prior med trial of zoloft 25mg, no known history of SA/NSSIB, no legal history, no substance use, presented to [facility] ED brought in by family for worsening paranoia, hallucinations and agitation at home.   Plan: Admit to 2West, 2PC  -Admit to 2west   -Continue CO 1:1 for fall risk   -Psychiatry:   OLANZapine Disintegrating Tablet 5 milliGRAM(s) Oral at bedtime  haloperidol     Tablet 5 milliGRAM(s) Oral every 6 hours PRN agitation  haloperidol    Injectable 5 milliGRAM(s) IntraMuscular once PRN severe agitation  LORazepam     Tablet 1 milliGRAM(s) Oral every 6 hours PRN Agitation  LORazepam   Injectable 1 milliGRAM(s) IntraMuscular once PRN severe agitation  -Medical:  atorvastatin 20 milliGRAM(s) Oral at bedtime  calcium carbonate 1250 mG  + Vitamin D (OsCal 500 + D) 1 Tablet(s) Oral daily  ferrous    sulfate 325 milliGRAM(s) Oral daily  multivitamin/minerals 1 Tablet(s) Oral daily  pantoprazole    Tablet 40 milliGRAM(s) Oral before breakfast  senna 2 Tablet(s) Oral at bedtime  acetaminophen     Tablet .. 650 milliGRAM(s) Oral every 6 hours PRN Mild Pain (1 - 3), Moderate Pain (4 - 6), Severe Pain (7 - 10)  aluminum hydroxide/magnesium hydroxide/simethicone Suspension 30 milliLiter(s) Oral every 6 hours PRN Dyspepsia  -Group and milieu therapy  -Dispo as per SW  
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, not currently in psychiatric treatment, taking seroquel 50mg qhs prescribed by PCP for insomnia, prior med trial of zoloft 25mg, no known history of SA/NSSIB, no legal history, no substance use, presented to [facility] ED brought in by family for worsening paranoia, hallucinations and agitation at home.       On assessment today, patient was pleasant and cooperative, denied AVH, SIIP.     Plan: Admit to 2West, 2PC  -Admit to 2west   -No CO needed, routine observation, q15 checks   -Psychiatry:   OLANZapine Disintegrating Tablet 5 milliGRAM(s) Oral at bedtime  haloperidol     Tablet 5 milliGRAM(s) Oral every 6 hours PRN agitation  haloperidol    Injectable 5 milliGRAM(s) IntraMuscular once PRN severe agitation  LORazepam     Tablet 1 milliGRAM(s) Oral every 6 hours PRN Agitation  LORazepam   Injectable 1 milliGRAM(s) IntraMuscular once PRN severe agitation  -Medical:  atorvastatin 20 milliGRAM(s) Oral at bedtime  calcium carbonate 1250 mG  + Vitamin D (OsCal 500 + D) 1 Tablet(s) Oral daily  ferrous    sulfate 325 milliGRAM(s) Oral daily  multivitamin/minerals 1 Tablet(s) Oral daily  pantoprazole    Tablet 40 milliGRAM(s) Oral before breakfast  senna 2 Tablet(s) Oral at bedtime  acetaminophen     Tablet .. 650 milliGRAM(s) Oral every 6 hours PRN Mild Pain (1 - 3), Moderate Pain (4 - 6), Severe Pain (7 - 10)  aluminum hydroxide/magnesium hydroxide/simethicone Suspension 30 milliLiter(s) Oral every 6 hours PRN Dyspepsia  -Group and milieu therapy  -Dispo as per SW  
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       On assessment today, patient was pleasant and cooperative, inappropriately laughing, denied AVH, SIIP.     Plan: Admit to 2West, 2PC  -Admit to 2west   -No CO needed, routine observation, q15 checks   -Psychiatry:   OLANZapine Disintegrating Tablet 5 milliGRAM(s) Oral at bedtime  haloperidol     Tablet 5 milliGRAM(s) Oral every 6 hours PRN agitation  haloperidol    Injectable 5 milliGRAM(s) IntraMuscular once PRN severe agitation  LORazepam     Tablet 1 milliGRAM(s) Oral every 6 hours PRN Agitation  LORazepam   Injectable 1 milliGRAM(s) IntraMuscular once PRN severe agitation  -Medical:  atorvastatin 20 milliGRAM(s) Oral at bedtime  calcium carbonate 1250 mG  + Vitamin D (OsCal 500 + D) 1 Tablet(s) Oral daily  ferrous    sulfate 325 milliGRAM(s) Oral daily  multivitamin/minerals 1 Tablet(s) Oral daily  pantoprazole    Tablet 40 milliGRAM(s) Oral before breakfast  senna 2 Tablet(s) Oral at bedtime  acetaminophen     Tablet .. 650 milliGRAM(s) Oral every 6 hours PRN Mild Pain (1 - 3), Moderate Pain (4 - 6), Severe Pain (7 - 10)  aluminum hydroxide/magnesium hydroxide/simethicone Suspension 30 milliLiter(s) Oral every 6 hours PRN Dyspepsia  -Group and milieu therapy  -Dispo as per SW

## 2024-08-29 NOTE — BH INPATIENT PSYCHIATRY PROGRESS NOTE - NSBHCHARTREVIEWVS_PSY_A_CORE FT
Vital Signs Last 24 Hrs  T(C): 36.5 (08-29-24 @ 07:48), Max: 36.5 (08-29-24 @ 07:48)  T(F): 97.7 (08-29-24 @ 07:48), Max: 97.7 (08-29-24 @ 07:48)  HR: --  BP: --  BP(mean): --  RR: 17 (08-29-24 @ 07:48) (17 - 17)  SpO2: --    Orthostatic VS  08-29-24 @ 07:48  Lying BP: --/-- HR: --  Sitting BP: 139/76 HR: 85  Standing BP: 143/79 HR: 93  Site: --  Mode: --  Orthostatic VS  08-28-24 @ 07:29  Lying BP: --/-- HR: --  Sitting BP: 124/90 HR: 93  Standing BP: 134/87 HR: 103  Site: --  Mode: --

## 2024-08-29 NOTE — BH INPATIENT PSYCHIATRY PROGRESS NOTE - NSBHFUPINTERVALHXFT_PSY_A_CORE
Patient seen for follow up of psychosis, chart reviewed, and case discussed with treatment team.  No events reported overnight.  The patient has been maintaining gains on the unit.  She continues to do well.  She is pleasant, bright, denies any delusions or hallucinations, denies any depression or SI, and behavior has been well controlled.  Her thoughts are goal directed.  The patient has been visible on the unit, social with staff, and attending groups.  She is walking with walker.  The patient reports eating and sleeping well.  She has been compliant with medications, tolerating them well.
Patient seen for follow up of psychosis, chart reviewed, and case discussed with treatment team.  No events reported overnight.  The patient continues to do well on the unit.  She is bright, denies any delusions or hallucinations, denies any depression, no SI, and behavior has been well controlled.  Her thoughts are goal directed.  The patient has been visible on the unit, social with staff, and attending groups.  The patient reports eating and sleeping well.  She has been compliant with medications, tolerating them well.
Patient seen for follow up of psychosis, chart reviewed, and case discussed with treatment team.  No events reported overnight.  The patient has been maintaining gains on the unit.  She denies any delusions or hallucinations.  She states her mood is good, no SI, and behavior has been well controlled.  The patient has been visible on the unit, social with staff, and attending groups.  The patient reports eating and sleeping well.  She has been compliant with medications, tolerating them well.
Patient seen for follow up of psychosis, chart reviewed, and case discussed with treatment team.  No events reported overnight.  The patient continues to do well on the unit.  She is pleasant, bright, denies any delusions or hallucinations, denies any depression or SI, and behavior has been well controlled.  Her thoughts are goal directed.  The patient has been visible on the unit, social with peers and staff, and attending groups.  She is walking with walker.  The patient reports eating and sleeping well.  She has been compliant with medications, tolerating them well.
No significant interval events. Patient has remained calm, cooperative, medication compliant and denies adverse medication side effects. tends to her ADLs, eating ,meals. Patient reports that she feels well and better as compared to admission. No complaints. No suicidality elicited. Patient remains very unsteady due to hx of Polio thus on CO1:1 .    
Chart reviewed and case discussed in team meeting. Patient reported feeling good denied AH, the man did not disturb her while here so far. Continues to laugh inappropriately. Patient remains on CO 1:1 for falls risk. Neurology consult called due to headaches, severely unsteady gait. Per recommendations MRI of head w/wo contrast ordered. Neurology met with patient. Ibuprofen ordered for headaches.  
Chart reviewed and case discussed in nursing. Patient reporting doing great, appreciative of staff's assistance. Patient denied AVH, has no concerns about the man or any other entities from the closet being in the hospital. Patient denied SIIP, AVH. Patient remains very unsteady due to hx of Polio so CO1:1 continued.    
Chart reviewed and case discussed in team meeting. Patient reported feeling good denied AH, denied the man being hear, was unsure if the man was still at home. Denied AVH. Discussed increasing Zyprexa to 10mg, she agreed. Patient remains on CO for falls risk. Patient complained of discomfort and burning when sitting unsure if sensation was in vagina or rectum. Ordered UA and urine culture.

## 2024-08-29 NOTE — BH INPATIENT PSYCHIATRY PROGRESS NOTE - CURRENT MEDICATION
MEDICATIONS  (STANDING):  atorvastatin 20 milliGRAM(s) Oral at bedtime  calcium carbonate 1250 mG  + Vitamin D (OsCal 500 + D) 1 Tablet(s) Oral at bedtime  ferrous    sulfate 325 milliGRAM(s) Oral at bedtime  levothyroxine 100 MICROGram(s) Oral <User Schedule>  multivitamin/minerals 1 Tablet(s) Oral at bedtime  OLANZapine Disintegrating Tablet 10 milliGRAM(s) Oral at bedtime  pantoprazole    Tablet 40 milliGRAM(s) Oral before breakfast  polyethylene glycol 3350 17 Gram(s) Oral daily  senna 2 Tablet(s) Oral at bedtime    MEDICATIONS  (PRN):  acetaminophen     Tablet .. 650 milliGRAM(s) Oral every 6 hours PRN Mild Pain (1 - 3), Moderate Pain (4 - 6), Severe Pain (7 - 10)  aluminum hydroxide/magnesium hydroxide/simethicone Suspension 30 milliLiter(s) Oral every 6 hours PRN Dyspepsia  artificial  tears Solution 1 Drop(s) Both EYES two times a day PRN Dry Eyes  calcium carbonate    500 mG (Tums) Chewable 1 Tablet(s) Chew every 4 hours PRN Acid reflux  haloperidol     Tablet 5 milliGRAM(s) Oral every 6 hours PRN agitation  haloperidol    Injectable 5 milliGRAM(s) IntraMuscular once PRN severe agitation  ibuprofen  Tablet. 400 milliGRAM(s) Oral every 6 hours PRN Moderate Pain (4 - 6), Severe Pain (7 - 10)  LORazepam     Tablet 1 milliGRAM(s) Oral every 6 hours PRN Agitation  LORazepam   Injectable 1 milliGRAM(s) IntraMuscular once PRN severe agitation  petrolatum white Ointment 1 Application(s) Topical daily PRN rash

## 2024-08-29 NOTE — BH INPATIENT PSYCHIATRY PROGRESS NOTE - MSE OPTIONS
Unstructured MSE
Structured MSE

## 2024-08-29 NOTE — BH INPATIENT PSYCHIATRY PROGRESS NOTE - MSE UNSTRUCTURED FT
The patient appears stated age, with fair hygiene, and is dressed appropriately.  She was calm, pleasant, and cooperative with the interview.  She maintained appropriate eye contact.  No restlessness or slowing of movements observed.  Gait is unsteady, uses walker.  The patient’s speech was fluent, normal in tone, rate, and volume.  The patient’s mood is “good.”  Her affect is full, reactive, stable, appropriate.  The patient’s thoughts are goal directed.  She does not have any delusions or hallucinations.  She does not have any suicidal or homicidal ideation, intent, or plan.  Insight is partial.  Judgment is fair.  Impulse control has been good on the unit.
The patient appears stated age, with fair hygiene, and is dressed appropriately.  She was calm, pleasant, and cooperative with the interview.  She maintained appropriate eye contact.  No restlessness or slowing of movements observed.  Gait is unsteady, uses walker.  The patient’s speech was fluent, normal in tone, rate, and volume.  The patient’s mood is “good.”  Her affect is full, reactive, stable, appropriate.  The patient’s thoughts are goal directed.  She does not have any delusions or hallucinations.  She does not have any suicidal or homicidal ideation, intent, or plan.  Insight is partial.  Judgment is fair.  Impulse control has been good on the unit.
The patient appears stated age, with fair hygiene, and is dressed appropriately.  She was calm, pleasant, and cooperative with the interview.  She maintained appropriate eye contact.  No restlessness or slowing of movements observed.  Gait is unsteady, uses walker.  The patient’s speech was fluent, normal in tone, rate, and volume.  The patient’s mood is “okay.”  Her affect is constricted, but stable, appropriate.  The patient’s thoughts are goal directed.  She does not have any delusions or hallucinations.  She does not have any suicidal or homicidal ideation, intent, or plan.  Insight is partial.  Judgment is fair.  Impulse control has been good on the unit.
The patient appears stated age, with fair hygiene, and is dressed appropriately.  She was calm, pleasant, and cooperative with the interview.  She maintained appropriate eye contact.  No restlessness or slowing of movements observed.  Gait is unsteady, uses walker.  The patient’s speech was fluent, normal in tone, rate, and volume.  The patient’s mood is “good.”  Her affect is full, reactive, stable, appropriate.  The patient’s thoughts are goal directed.  She does not have any delusions or hallucinations.  She does not have any suicidal or homicidal ideation, intent, or plan.  Insight is partial.  Judgment is fair.  Impulse control has been good on the unit.

## 2024-08-29 NOTE — BH INPATIENT PSYCHIATRY PROGRESS NOTE - NSBHATTESTTYPEVISIT_PSY_A_CORE
Attending Only
On-site Attending supervising NEGIN (99XXX codes)
Attending Only
NEGIN without on-site Attending supervision
On-site Attending supervising NEGIN (99XXX codes)

## 2024-08-29 NOTE — BH INPATIENT PSYCHIATRY PROGRESS NOTE - NSTXFALLGOAL_PSY_ALL_CORE
Ask for assistance when getting out of bed/chair and upon ambulation

## 2024-08-29 NOTE — BH INPATIENT PSYCHIATRY PROGRESS NOTE - NSDCCRITERIA_PSY_ALL_CORE
Decreased AH, CGI </=3

## 2024-08-30 VITALS — RESPIRATION RATE: 19 BRPM | TEMPERATURE: 98 F

## 2024-08-30 PROCEDURE — 99238 HOSP IP/OBS DSCHRG MGMT 30/<: CPT

## 2024-08-30 RX ADMIN — Medication 40 MILLIGRAM(S): at 06:39

## 2024-08-30 RX ADMIN — Medication 100 MICROGRAM(S): at 06:11

## 2024-08-30 NOTE — BH INPATIENT PSYCHIATRY DISCHARGE NOTE - NSBHFUPINTERVALHXFT_PSY_A_CORE
Patient seen for follow up of psychosis, chart reviewed, and case discussed with treatment team.  No events reported overnight.  The patient continues to do well on the unit.  She is happy to be discharged today.  She denies any concerns about discharge.  She continues to be pleasant, bright, denies any delusions or hallucinations, denies any depression or SI, and behavior has been well controlled.  Her thoughts are goal directed.  The patient has been visible on the unit, social with peers and staff, and attending groups.  She is walking with walker.  The patient reports eating and sleeping well.  She has been compliant with medications, tolerating them well.

## 2024-08-30 NOTE — BH INPATIENT PSYCHIATRY DISCHARGE NOTE - NSDCMRMEDTOKEN_GEN_ALL_CORE_FT
atorvastatin 20 mg oral tablet: 1 tab(s) orally once a day (at bedtime)  ferrous sulfate 325 mg (65 mg elemental iron) oral tablet: 1 tab(s) orally once a day (at bedtime)  levothyroxine 100 mcg (0.1 mg) oral tablet: 1 tab(s) orally once a day  Multiple Vitamins with Minerals oral tablet: 1 tab(s) orally once a day (at bedtime)  OLANZapine 10 mg oral tablet: 1 tab(s) orally once a day (at bedtime)  pantoprazole 40 mg oral delayed release tablet: 1 tab(s) orally once a day (before a meal)  polyethylene glycol 3350 oral powder for reconstitution: 17 gram(s) orally once a day  senna leaf extract oral tablet: 2 tab(s) orally once a day (at bedtime)  vitamin D-calcium (as carbonate) 5 mcg-500 mg oral tablet: 1 tab(s) orally once a day (at bedtime)

## 2024-08-30 NOTE — BH INPATIENT PSYCHIATRY DISCHARGE NOTE - MSE UNSTRUCTURED FT
The patient appears stated age, with fair hygiene, and is dressed appropriately.  She was calm, pleasant, and cooperative with the interview.  She maintained appropriate eye contact.  No restlessness or slowing of movements observed.  Gait is unsteady, uses walker.  The patient’s speech was fluent, normal in tone, rate, and volume.  The patient’s mood is “good.”  Her affect is full, reactive, stable, appropriate.  The patient’s thoughts are goal directed.  She does not have any delusions or hallucinations.  She does not have any suicidal or homicidal ideation, intent, or plan.  Insight is partial.  Judgment is fair.  Impulse control has been good on the unit.

## 2024-08-30 NOTE — BH INPATIENT PSYCHIATRY DISCHARGE NOTE - NSBHASSESSSUMMFT_PSY_ALL_CORE
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     The patient has continued to show improvement in symptoms.  She denies any delusions or hallucinations.  She has been bright, denies any depression, anxiety, or SI, and her behavior has been well controlled.  The patient has been sleeping well.  The patient has been engaged in treatment on the unit, compliant with medications, and is looking forward to continuing care as an outpatient.  The patient has support from her family, who are also protective factors for her.  She was able to safety plan appropriately.  The patient’s risk cannot be further decreased with continued inpatient hospitalization.  She is no longer an acute danger to herself or others, and is stable for discharge home.

## 2024-08-30 NOTE — BH INPATIENT PSYCHIATRY DISCHARGE NOTE - NSBHMETABOLIC_PSY_ALL_CORE_FT
BMI: BMI (kg/m2): 27.7 (08-21-24 @ 13:30)  HbA1c: A1C with Estimated Average Glucose Result: 6.3 % (08-23-24 @ 10:18)    Glucose: POCT Blood Glucose.: 193 mg/dL (08-20-24 @ 18:04)    BP: --Vital Signs Last 24 Hrs  T(C): 36.4 (08-30-24 @ 08:03), Max: 36.4 (08-30-24 @ 08:03)  T(F): 97.6 (08-30-24 @ 08:03), Max: 97.6 (08-30-24 @ 08:03)  HR: --  BP: --  BP(mean): --  RR: 19 (08-30-24 @ 08:03) (19 - 19)  SpO2: --    Orthostatic VS  08-30-24 @ 08:03  Lying BP: --/-- HR: --  Sitting BP: 136/98 HR: 96  Standing BP: 140/90 HR: 100  Site: --  Mode: --  Orthostatic VS  08-29-24 @ 07:48  Lying BP: --/-- HR: --  Sitting BP: 139/76 HR: 85  Standing BP: 143/79 HR: 93  Site: --  Mode: --    Lipid Panel: Date/Time: 08-23-24 @ 10:18  Cholesterol, Serum: 205  LDL Cholesterol Calculated: 133  HDL Cholesterol, Serum: 46  Total Cholesterol/HDL Ration Measurement: --  Triglycerides, Serum: 130
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
## 2024-08-30 NOTE — BH INPATIENT PSYCHIATRY DISCHARGE NOTE - HOSPITAL COURSE
The patient was admitted with auditory hallucinations threatening to harm her.  She was started on Zyprexa, increased to 10mg hs which she tolerated well.  With treatment, the patient quickly improved.  She started to deny any hallucinations.  She denied any delusions.  The patient's mood was good, she was active on the unit, bright, social with peers and staff.  The patient was eating well and sleeping well.  As she had an unsteady gait, the patient was maintained on 1:1 throughout hospitalization for additional support.    The patient complained of chronic headaches.  Seen by neurology and MRI with and without contrast was conducted which was normal.  The patient had off and on headaches and had relief with Tylenol.    On the day of discharge, the patient has continued to show improvement in symptoms.  She denies any delusions or hallucinations.  She has been bright, denies any depression, anxiety, or SI, and her behavior has been well controlled.  The patient has been sleeping well.  The patient has been engaged in treatment on the unit, compliant with medications, and is looking forward to continuing care as an outpatient.  The patient has support from her family, who are also protective factors for her.  She was able to safety plan appropriately.  The patient’s risk cannot be further decreased with continued inpatient hospitalization.  She is no longer an acute danger to herself or others, and is stable for discharge home.

## 2024-08-30 NOTE — BH INPATIENT PSYCHIATRY DISCHARGE NOTE - OTHER PAST PSYCHIATRIC HISTORY (INCLUDE DETAILS REGARDING ONSET, COURSE OF ILLNESS, INPATIENT/OUTPATIENT TREATMENT)
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 Patient now admitted to  for psychiatric stabilization.  Per chart review, Patient was no in any formal treatment prior to visit in [facility].

## 2024-09-06 ENCOUNTER — OUTPATIENT (OUTPATIENT)
Dept: OUTPATIENT SERVICES | Facility: HOSPITAL | Age: 59
LOS: 1 days | Discharge: ANOTHER HOSPITAL | End: 2024-09-06
Payer: MEDICAID

## 2024-09-06 PROCEDURE — 90839 PSYTX CRISIS INITIAL 60 MIN: CPT

## 2024-09-10 DIAGNOSIS — F20.9 SCHIZOPHRENIA, UNSPECIFIED: ICD-10-CM

## 2024-10-11 ENCOUNTER — APPOINTMENT (OUTPATIENT)
Dept: MAMMOGRAPHY | Facility: IMAGING CENTER | Age: 59
End: 2024-10-11
Payer: MEDICAID

## 2024-10-11 ENCOUNTER — APPOINTMENT (OUTPATIENT)
Dept: RADIOLOGY | Facility: IMAGING CENTER | Age: 59
End: 2024-10-11
Payer: MEDICAID

## 2024-10-11 ENCOUNTER — RESULT REVIEW (OUTPATIENT)
Age: 59
End: 2024-10-11

## 2024-10-11 ENCOUNTER — OUTPATIENT (OUTPATIENT)
Dept: OUTPATIENT SERVICES | Facility: HOSPITAL | Age: 59
LOS: 1 days | End: 2024-10-11
Payer: MEDICAID

## 2024-10-11 DIAGNOSIS — Z12.39 ENCOUNTER FOR OTHER SCREENING FOR MALIGNANT NEOPLASM OF BREAST: ICD-10-CM

## 2024-10-11 DIAGNOSIS — Z00.8 ENCOUNTER FOR OTHER GENERAL EXAMINATION: ICD-10-CM

## 2024-10-11 PROCEDURE — 77063 BREAST TOMOSYNTHESIS BI: CPT | Mod: 26

## 2024-10-11 PROCEDURE — 77080 DXA BONE DENSITY AXIAL: CPT

## 2024-10-11 PROCEDURE — 77063 BREAST TOMOSYNTHESIS BI: CPT

## 2024-10-11 PROCEDURE — 77080 DXA BONE DENSITY AXIAL: CPT | Mod: 26

## 2024-10-11 PROCEDURE — 77067 SCR MAMMO BI INCL CAD: CPT

## 2024-10-11 PROCEDURE — 77067 SCR MAMMO BI INCL CAD: CPT | Mod: 26

## 2025-05-27 NOTE — BH INPATIENT PSYCHIATRY DISCHARGE NOTE - NSDCRECOMMEND_PSY_ALL_CORE
Goal Outcome Evaluation:  Plan of Care Reviewed With: patient        Progress: improving  Outcome Evaluation: Pt seen for PT this AM - mod A of 2 for bed mobility and tolerates increased time sitting EOB for LE exercises. noted to fatigue wtih activity and increased posterior lean with dynamic movements. Cues for posture throughout. Unable to progress to tranfers as no KI present in room for L LE - RN updated. Recommend SNU at d/c.    Anticipated Discharge Disposition (PT): skilled nursing facility                         Unrestricted diet/activity/Recommended medical follow-up following discharge...

## (undated) DEVICE — ELCTR ECG CONDUCTIVE ADHESIVE

## (undated) DEVICE — GOWN LG

## (undated) DEVICE — SALIVA EJECTOR (BLUE)

## (undated) DEVICE — BIOPSY FORCEP RADIAL JAW 4 STANDARD WITH NEEDLE

## (undated) DEVICE — DRSG CURITY GAUZE SPONGE 4 X 4" 12-PLY NON-STERILE

## (undated) DEVICE — TUBING MEDI-VAC W MAXIGRIP CONNECTORS 1/4"X6'

## (undated) DEVICE — DRSG BANDAID 0.75X3"

## (undated) DEVICE — LUBRICATING JELLY HR ONE SHOT 3G

## (undated) DEVICE — BASIN EMESIS 10IN GRADUATED MAUVE

## (undated) DEVICE — DENTURE CUP PINK

## (undated) DEVICE — LINE BREATHE SAMPLNG

## (undated) DEVICE — CLAMP BX HOT RAD JAW 3

## (undated) DEVICE — PACK IV START WITH CHG

## (undated) DEVICE — UNDERPAD LINEN SAVER 17 X 24"

## (undated) DEVICE — DRSG 2X2

## (undated) DEVICE — BITE BLOCK ADULT 20 X 27MM (GREEN)

## (undated) DEVICE — CONTAINER FORMALIN 80ML YELLOW

## (undated) DEVICE — TUBING IV SET GRAVITY 3Y 100" MACRO

## (undated) DEVICE — BIOPSY FORCEP COLD DISP

## (undated) DEVICE — CATH IV SAFE BC 22G X 1" (BLUE)